# Patient Record
Sex: MALE | Race: WHITE | NOT HISPANIC OR LATINO | Employment: FULL TIME | URBAN - METROPOLITAN AREA
[De-identification: names, ages, dates, MRNs, and addresses within clinical notes are randomized per-mention and may not be internally consistent; named-entity substitution may affect disease eponyms.]

---

## 2022-12-08 ENCOUNTER — CLINICAL SUPPORT (OUTPATIENT)
Dept: INTERNAL MEDICINE | Facility: CLINIC | Age: 62
End: 2022-12-08

## 2022-12-08 ENCOUNTER — OFFICE VISIT (OUTPATIENT)
Dept: INTERNAL MEDICINE | Facility: CLINIC | Age: 62
End: 2022-12-08

## 2022-12-08 VITALS
DIASTOLIC BLOOD PRESSURE: 70 MMHG | WEIGHT: 205.38 LBS | HEART RATE: 62 BPM | BODY MASS INDEX: 30.42 KG/M2 | HEIGHT: 69 IN | SYSTOLIC BLOOD PRESSURE: 104 MMHG

## 2022-12-08 DIAGNOSIS — Z00.00 ENCOUNTER FOR ANNUAL HEALTH EXAMINATION: Primary | ICD-10-CM

## 2022-12-08 DIAGNOSIS — Z00.00 ROUTINE GENERAL MEDICAL EXAMINATION AT A HEALTH CARE FACILITY: Primary | ICD-10-CM

## 2022-12-08 DIAGNOSIS — Z23 NEED FOR SHINGLES VACCINE: ICD-10-CM

## 2022-12-08 LAB
ALBUMIN SERPL BCP-MCNC: 4 G/DL (ref 3.5–5.2)
ALP SERPL-CCNC: 88 U/L (ref 55–135)
ALT SERPL W/O P-5'-P-CCNC: 11 U/L (ref 10–44)
ANION GAP SERPL CALC-SCNC: 10 MMOL/L (ref 8–16)
AST SERPL-CCNC: 14 U/L (ref 10–40)
BILIRUB SERPL-MCNC: 1 MG/DL (ref 0.1–1)
BUN SERPL-MCNC: 14 MG/DL (ref 8–23)
CALCIUM SERPL-MCNC: 9.1 MG/DL (ref 8.7–10.5)
CHLORIDE SERPL-SCNC: 106 MMOL/L (ref 95–110)
CHOLEST SERPL-MCNC: 196 MG/DL (ref 120–199)
CHOLEST/HDLC SERPL: 4.2 {RATIO} (ref 2–5)
CO2 SERPL-SCNC: 24 MMOL/L (ref 23–29)
COMPLEXED PSA SERPL-MCNC: 0.65 NG/ML (ref 0–4)
CREAT SERPL-MCNC: 1.1 MG/DL (ref 0.5–1.4)
ERYTHROCYTE [DISTWIDTH] IN BLOOD BY AUTOMATED COUNT: 12.1 % (ref 11.5–14.5)
EST. GFR  (NO RACE VARIABLE): >60 ML/MIN/1.73 M^2
ESTIMATED AVG GLUCOSE: 103 MG/DL (ref 68–131)
GLUCOSE SERPL-MCNC: 96 MG/DL (ref 70–110)
HBA1C MFR BLD: 5.2 % (ref 4–5.6)
HCT VFR BLD AUTO: 48.9 % (ref 40–54)
HCV AB SERPL QL IA: NORMAL
HDLC SERPL-MCNC: 47 MG/DL (ref 40–75)
HDLC SERPL: 24 % (ref 20–50)
HGB BLD-MCNC: 16.5 G/DL (ref 14–18)
HIV 1+2 AB+HIV1 P24 AG SERPL QL IA: NORMAL
LDLC SERPL CALC-MCNC: 118.6 MG/DL (ref 63–159)
MCH RBC QN AUTO: 30.9 PG (ref 27–31)
MCHC RBC AUTO-ENTMCNC: 33.7 G/DL (ref 32–36)
MCV RBC AUTO: 92 FL (ref 82–98)
NONHDLC SERPL-MCNC: 149 MG/DL
PLATELET # BLD AUTO: 168 K/UL (ref 150–450)
PMV BLD AUTO: 10 FL (ref 9.2–12.9)
POTASSIUM SERPL-SCNC: 3.8 MMOL/L (ref 3.5–5.1)
PROT SERPL-MCNC: 6.7 G/DL (ref 6–8.4)
RBC # BLD AUTO: 5.34 M/UL (ref 4.6–6.2)
SODIUM SERPL-SCNC: 140 MMOL/L (ref 136–145)
TRIGL SERPL-MCNC: 152 MG/DL (ref 30–150)
TSH SERPL DL<=0.005 MIU/L-ACNC: 1.41 UIU/ML (ref 0.4–4)
WBC # BLD AUTO: 4.47 K/UL (ref 3.9–12.7)

## 2022-12-08 PROCEDURE — 90750 ZOSTER RECOMBINANT VACCINE: ICD-10-PCS | Mod: S$GLB,,, | Performed by: INTERNAL MEDICINE

## 2022-12-08 PROCEDURE — 99386 PREV VISIT NEW AGE 40-64: CPT | Mod: S$GLB,,, | Performed by: INTERNAL MEDICINE

## 2022-12-08 PROCEDURE — 99999 PR PBB SHADOW E&M-NEW PATIENT-LVL III: ICD-10-PCS | Mod: PBBFAC,,, | Performed by: INTERNAL MEDICINE

## 2022-12-08 PROCEDURE — 90471 IMMUNIZATION ADMIN: CPT | Mod: S$GLB,,, | Performed by: INTERNAL MEDICINE

## 2022-12-08 PROCEDURE — 84443 ASSAY THYROID STIM HORMONE: CPT | Performed by: INTERNAL MEDICINE

## 2022-12-08 PROCEDURE — 85027 COMPLETE CBC AUTOMATED: CPT | Performed by: INTERNAL MEDICINE

## 2022-12-08 PROCEDURE — 87389 HIV-1 AG W/HIV-1&-2 AB AG IA: CPT | Performed by: INTERNAL MEDICINE

## 2022-12-08 PROCEDURE — 90750 HZV VACC RECOMBINANT IM: CPT | Mod: S$GLB,,, | Performed by: INTERNAL MEDICINE

## 2022-12-08 PROCEDURE — 80053 COMPREHEN METABOLIC PANEL: CPT | Performed by: INTERNAL MEDICINE

## 2022-12-08 PROCEDURE — 99386 PR PREVENTIVE VISIT,NEW,40-64: ICD-10-PCS | Mod: S$GLB,,, | Performed by: INTERNAL MEDICINE

## 2022-12-08 PROCEDURE — 80061 LIPID PANEL: CPT | Performed by: INTERNAL MEDICINE

## 2022-12-08 PROCEDURE — 86803 HEPATITIS C AB TEST: CPT | Performed by: INTERNAL MEDICINE

## 2022-12-08 PROCEDURE — 90471 ZOSTER RECOMBINANT VACCINE: ICD-10-PCS | Mod: S$GLB,,, | Performed by: INTERNAL MEDICINE

## 2022-12-08 PROCEDURE — 84153 ASSAY OF PSA TOTAL: CPT | Performed by: INTERNAL MEDICINE

## 2022-12-08 PROCEDURE — 99999 PR PBB SHADOW E&M-NEW PATIENT-LVL III: CPT | Mod: PBBFAC,,, | Performed by: INTERNAL MEDICINE

## 2022-12-08 PROCEDURE — 83036 HEMOGLOBIN GLYCOSYLATED A1C: CPT | Performed by: INTERNAL MEDICINE

## 2022-12-08 RX ORDER — FLUOXETINE HYDROCHLORIDE 20 MG/1
1 CAPSULE ORAL DAILY
COMMUNITY
Start: 2022-08-02 | End: 2023-01-19

## 2022-12-08 RX ORDER — FLUTICASONE PROPIONATE 50 MCG
1 SPRAY, SUSPENSION (ML) NASAL
COMMUNITY

## 2022-12-08 RX ORDER — FAMOTIDINE 10 MG/1
10 TABLET ORAL 2 TIMES DAILY
COMMUNITY
End: 2023-09-25

## 2022-12-08 RX ORDER — CHOLECALCIFEROL (VITAMIN D3) 25 MCG
2000 TABLET ORAL DAILY
COMMUNITY
End: 2023-11-30

## 2022-12-08 RX ORDER — ALLOPURINOL 100 MG/1
100 TABLET ORAL
COMMUNITY
End: 2023-01-19

## 2022-12-08 RX ORDER — ALBUTEROL SULFATE 90 UG/1
1-2 AEROSOL, METERED RESPIRATORY (INHALATION) EVERY 6 HOURS PRN
COMMUNITY

## 2022-12-08 NOTE — PROGRESS NOTES
Subjective:       Patient ID: Oneal Reina is a 62 y.o. male.    Chief Complaint: Executive Health      HPI  Annual health exam. Reviewed medical, surgical, social and family history, medications, appropriate preventive health screenings, as well as vaccination history. Updates as noted below or in assessment and plan.    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 Originally from [place], [place]. Generally well. Exercising regularly and having some mild right shoulder pains since starting arm exercises. ROM and strength normal. Weight down recently. Watching diet but does eat fair amount of carbs. Mild HTG in past. Hx of 1 gout flair (confirmed) 15 yrs ago and no issues since with allopurinol 100 daily.    Review of Systems   All other systems reviewed and are negative.    Past Medical History:   Diagnosis Date    Allergic rhinitis     Anxiety     DDD (degenerative disc disease), cervical     GERD (gastroesophageal reflux disease)     Gout     History of shingles     Hypertriglyceridemia     Reactive airway disease     Renal cell carcinoma     Right, s/p partial nephrectomy         Current Outpatient Medications:     albuterol (PROVENTIL/VENTOLIN HFA) 90 mcg/actuation inhaler, Inhale 1-2 puffs into the lungs every 6 (six) hours as needed., Disp: , Rfl:     allopurinoL (ZYLOPRIM) 100 MG tablet, Take 100 mg by mouth., Disp: , Rfl:     CYANOCOBALAMIN, VITAMIN B-12, ORAL, Take 5,000 mcg by mouth., Disp: , Rfl:     famotidine (PEPCID) 10 MG tablet, Take 10 mg by mouth 2 (two) times daily., Disp: , Rfl:     FLUoxetine 20 MG capsule, Take 1 capsule by mouth once daily., Disp: , Rfl:     fluticasone propionate (FLONASE) 50 mcg/actuation nasal spray, 1 spray by Nasal route., Disp: , Rfl:     vitamin D (VITAMIN D3) 1000 units Tab, Take 2,000 Units by mouth once daily., Disp: , Rfl:     Past Surgical History:   Procedure Laterality Date    COLONOSCOPY  2021    Normal     ESOPHAGOGASTRODUODENOSCOPY      PARTIAL NEPHRECTOMY Right     RCC       Family History   Problem Relation Age of Onset    Breast cancer Mother     Testicular cancer Father     Pancreatic cancer Father     Colon polyps Brother     Brain cancer Paternal Grandmother     Colon cancer Neg Hx        Social History     Tobacco Use    Smoking status: Never    Smokeless tobacco: Never   Substance Use Topics    Alcohol use: Yes     Comment: Rarely       Immunization History   Administered Date(s) Administered    COVID-19, MRNA, LN-S, PF (Pfizer) (Purple Cap) 12/15/2020, 01/05/2021, 09/18/2021    Tdap 11/19/2018         Objective:      Vitals:    12/08/22 0838   BP: 104/70   Pulse: 62       Physical Exam  Constitutional:       General: He is not in acute distress.     Appearance: Normal appearance. He is well-developed. He is not ill-appearing.   HENT:      Head: Normocephalic and atraumatic.      Right Ear: Hearing and tympanic membrane normal. There is no impacted cerumen.      Left Ear: Hearing and tympanic membrane normal. There is no impacted cerumen.      Nose: Nose normal.      Mouth/Throat:      Mouth: Mucous membranes are moist.      Pharynx: Oropharynx is clear.   Eyes:      Extraocular Movements: Extraocular movements intact.      Conjunctiva/sclera: Conjunctivae normal.      Pupils: Pupils are equal, round, and reactive to light.   Neck:      Vascular: No carotid bruit.   Cardiovascular:      Rate and Rhythm: Normal rate and regular rhythm.      Heart sounds: Normal heart sounds. No murmur heard.  Pulmonary:      Effort: Pulmonary effort is normal. No respiratory distress.      Breath sounds: Normal breath sounds. No wheezing, rhonchi or rales.   Abdominal:      General: Abdomen is flat. There is no distension.      Palpations: Abdomen is soft. There is no mass.      Tenderness: There is no abdominal tenderness.      Hernia: No hernia is present.   Musculoskeletal:         General: Tenderness (Mild lateral right  shoulder joint.) present. No swelling, deformity or signs of injury. Normal range of motion.      Cervical back: Normal range of motion. No tenderness.      Right lower leg: No edema.      Left lower leg: No edema.   Lymphadenopathy:      Cervical: No cervical adenopathy.   Skin:     General: Skin is warm and dry.      Findings: No lesion or rash.   Neurological:      General: No focal deficit present.      Mental Status: He is alert and oriented to person, place, and time.      Cranial Nerves: No cranial nerve deficit.      Coordination: Coordination normal.      Gait: Gait normal.   Psychiatric:         Mood and Affect: Mood normal.         Behavior: Behavior normal.         Thought Content: Thought content normal.         Judgment: Judgment normal.       Recent Results (from the past 2016 hour(s))   Comprehensive metabolic panel    Collection Time: 12/08/22  7:28 AM   Result Value Ref Range    Sodium 140 136 - 145 mmol/L    Potassium 3.8 3.5 - 5.1 mmol/L    Chloride 106 95 - 110 mmol/L    CO2 24 23 - 29 mmol/L    Glucose 96 70 - 110 mg/dL    BUN 14 8 - 23 mg/dL    Creatinine 1.1 0.5 - 1.4 mg/dL    Calcium 9.1 8.7 - 10.5 mg/dL    Total Protein 6.7 6.0 - 8.4 g/dL    Albumin 4.0 3.5 - 5.2 g/dL    Total Bilirubin 1.0 0.1 - 1.0 mg/dL    Alkaline Phosphatase 88 55 - 135 U/L    AST 14 10 - 40 U/L    ALT 11 10 - 44 U/L    Anion Gap 10 8 - 16 mmol/L    eGFR >60.0 >60 mL/min/1.73 m^2   CBC Without Differential    Collection Time: 12/08/22  7:28 AM   Result Value Ref Range    WBC 4.47 3.90 - 12.70 K/uL    RBC 5.34 4.60 - 6.20 M/uL    Hemoglobin 16.5 14.0 - 18.0 g/dL    Hematocrit 48.9 40.0 - 54.0 %    MCV 92 82 - 98 fL    MCH 30.9 27.0 - 31.0 pg    MCHC 33.7 32.0 - 36.0 g/dL    RDW 12.1 11.5 - 14.5 %    Platelets 168 150 - 450 K/uL    MPV 10.0 9.2 - 12.9 fL   Lipid panel    Collection Time: 12/08/22  7:28 AM   Result Value Ref Range    Cholesterol 196 120 - 199 mg/dL    Triglycerides 152 (H) 30 - 150 mg/dL    HDL 47 40 - 75  mg/dL    LDL Cholesterol 118.6 63.0 - 159.0 mg/dL    HDL/Cholesterol Ratio 24.0 20.0 - 50.0 %    Total Cholesterol/HDL Ratio 4.2 2.0 - 5.0    Non-HDL Cholesterol 149 mg/dL   TSH    Collection Time: 12/08/22  7:28 AM   Result Value Ref Range    TSH 1.413 0.400 - 4.000 uIU/mL   PSA, Screening (every year)    Collection Time: 12/08/22  7:28 AM   Result Value Ref Range    PSA, Screen 0.65 0.00 - 4.00 ng/mL   Hepatitis C Antibody    Collection Time: 12/08/22  7:28 AM   Result Value Ref Range    Hepatitis C Ab Non-reactive Non-reactive   HIV 1/2 Ag/Ab (4th Gen)    Collection Time: 12/08/22  7:28 AM   Result Value Ref Range    HIV 1/2 Ag/Ab Non-reactive Non-reactive          Assessment/Plan:     1) Annual wellness exam  2) HTG - Very mild elevation with average cholesterol levels otherwise. Continue weight, exercise, diet focus with limitation of sugars/carbs. Repeat levels 1 yr.  3) Gout - Controlled for years on allopurinol 100 daily. He may trial off of this in future since borderline uric acid at diagnosis and only had the 1 episode. Agreed on continuing for now though since tolerating well.  4) Anxiety - Controlled on Fluoxetine 20 daily. May consider trial weaning in future but continuing for now.  5) GERD - Controlled on Pepcid nightly.  6) Allergic rhinitis, Reactive airway - Controlled with Flonase and occasional albuterol prn.  7) RCC hx - S/P right partial nephrectomy. Renal function healthy today. Surveillance as planned by previously following physicians.    - Annual flu vaccine received recently.  - 1st dose Shingrix today, 2nd dose 2 to 6 mths.  - Colonoscopy normal last yr with plan for 10 yr f/u.  - PSA normal. Consider repeat screening 1 yr.  - Glucose screen normal. Blood pressure normal.

## 2023-01-19 ENCOUNTER — PATIENT MESSAGE (OUTPATIENT)
Dept: INTERNAL MEDICINE | Facility: CLINIC | Age: 63
End: 2023-01-19
Payer: COMMERCIAL

## 2023-01-19 DIAGNOSIS — F41.9 ANXIETY: ICD-10-CM

## 2023-01-19 DIAGNOSIS — M10.9 GOUT, UNSPECIFIED CAUSE, UNSPECIFIED CHRONICITY, UNSPECIFIED SITE: Primary | ICD-10-CM

## 2023-01-19 RX ORDER — FLUOXETINE HYDROCHLORIDE 20 MG/1
20 CAPSULE ORAL DAILY
Qty: 90 CAPSULE | Refills: 3 | Status: SHIPPED | OUTPATIENT
Start: 2023-01-19 | End: 2024-02-14 | Stop reason: SDUPTHER

## 2023-01-19 RX ORDER — ALLOPURINOL 100 MG/1
100 TABLET ORAL DAILY
Qty: 90 TABLET | Refills: 3 | Status: SHIPPED | OUTPATIENT
Start: 2023-01-19 | End: 2023-02-17

## 2023-02-07 ENCOUNTER — PATIENT MESSAGE (OUTPATIENT)
Dept: INTERNAL MEDICINE | Facility: CLINIC | Age: 63
End: 2023-02-07
Payer: COMMERCIAL

## 2023-02-17 ENCOUNTER — OFFICE VISIT (OUTPATIENT)
Dept: UROLOGY | Facility: CLINIC | Age: 63
End: 2023-02-17
Payer: COMMERCIAL

## 2023-02-17 VITALS
HEIGHT: 69 IN | SYSTOLIC BLOOD PRESSURE: 125 MMHG | DIASTOLIC BLOOD PRESSURE: 80 MMHG | HEART RATE: 68 BPM | BODY MASS INDEX: 30.56 KG/M2 | WEIGHT: 206.31 LBS

## 2023-02-17 DIAGNOSIS — Z85.528 HISTORY OF RENAL CELL CARCINOMA: ICD-10-CM

## 2023-02-17 DIAGNOSIS — N48.89 PENIS PAIN: Primary | ICD-10-CM

## 2023-02-17 DIAGNOSIS — Z90.5 HISTORY OF PARTIAL NEPHRECTOMY: ICD-10-CM

## 2023-02-17 DIAGNOSIS — N48.6 PEYRONIE'S SYNDROME: ICD-10-CM

## 2023-02-17 LAB
BILIRUB SERPL-MCNC: NORMAL MG/DL
BLOOD URINE, POC: NORMAL
CLARITY, POC UA: CLEAR
COLOR, POC UA: YELLOW
GLUCOSE UR QL STRIP: NORMAL
KETONES UR QL STRIP: NORMAL
LEUKOCYTE ESTERASE URINE, POC: NORMAL
NITRITE, POC UA: NORMAL
PH, POC UA: 6
PROTEIN, POC: NORMAL
SPECIFIC GRAVITY, POC UA: 1.01
UROBILINOGEN, POC UA: NORMAL

## 2023-02-17 PROCEDURE — 81002 URINALYSIS NONAUTO W/O SCOPE: CPT | Mod: S$GLB,,, | Performed by: NURSE PRACTITIONER

## 2023-02-17 PROCEDURE — 99999 PR PBB SHADOW E&M-EST. PATIENT-LVL III: CPT | Mod: PBBFAC,,, | Performed by: NURSE PRACTITIONER

## 2023-02-17 PROCEDURE — 3008F BODY MASS INDEX DOCD: CPT | Mod: CPTII,S$GLB,, | Performed by: NURSE PRACTITIONER

## 2023-02-17 PROCEDURE — 99999 PR PBB SHADOW E&M-EST. PATIENT-LVL III: ICD-10-PCS | Mod: PBBFAC,,, | Performed by: NURSE PRACTITIONER

## 2023-02-17 PROCEDURE — 1159F MED LIST DOCD IN RCRD: CPT | Mod: CPTII,S$GLB,, | Performed by: NURSE PRACTITIONER

## 2023-02-17 PROCEDURE — 1160F PR REVIEW ALL MEDS BY PRESCRIBER/CLIN PHARMACIST DOCUMENTED: ICD-10-PCS | Mod: CPTII,S$GLB,, | Performed by: NURSE PRACTITIONER

## 2023-02-17 PROCEDURE — 99204 OFFICE O/P NEW MOD 45 MIN: CPT | Mod: S$GLB,,, | Performed by: NURSE PRACTITIONER

## 2023-02-17 PROCEDURE — 81002 POCT URINE DIPSTICK WITHOUT MICROSCOPE: ICD-10-PCS | Mod: S$GLB,,, | Performed by: NURSE PRACTITIONER

## 2023-02-17 PROCEDURE — 1159F PR MEDICATION LIST DOCUMENTED IN MEDICAL RECORD: ICD-10-PCS | Mod: CPTII,S$GLB,, | Performed by: NURSE PRACTITIONER

## 2023-02-17 PROCEDURE — 3074F SYST BP LT 130 MM HG: CPT | Mod: CPTII,S$GLB,, | Performed by: NURSE PRACTITIONER

## 2023-02-17 PROCEDURE — 3079F PR MOST RECENT DIASTOLIC BLOOD PRESSURE 80-89 MM HG: ICD-10-PCS | Mod: CPTII,S$GLB,, | Performed by: NURSE PRACTITIONER

## 2023-02-17 PROCEDURE — 99204 PR OFFICE/OUTPT VISIT, NEW, LEVL IV, 45-59 MIN: ICD-10-PCS | Mod: S$GLB,,, | Performed by: NURSE PRACTITIONER

## 2023-02-17 PROCEDURE — 3079F DIAST BP 80-89 MM HG: CPT | Mod: CPTII,S$GLB,, | Performed by: NURSE PRACTITIONER

## 2023-02-17 PROCEDURE — 3008F PR BODY MASS INDEX (BMI) DOCUMENTED: ICD-10-PCS | Mod: CPTII,S$GLB,, | Performed by: NURSE PRACTITIONER

## 2023-02-17 PROCEDURE — 1160F RVW MEDS BY RX/DR IN RCRD: CPT | Mod: CPTII,S$GLB,, | Performed by: NURSE PRACTITIONER

## 2023-02-17 PROCEDURE — 3074F PR MOST RECENT SYSTOLIC BLOOD PRESSURE < 130 MM HG: ICD-10-PCS | Mod: CPTII,S$GLB,, | Performed by: NURSE PRACTITIONER

## 2023-02-17 RX ORDER — NAPROXEN 500 MG/1
500 TABLET ORAL 2 TIMES DAILY WITH MEALS
Qty: 30 TABLET | Refills: 1 | Status: SHIPPED | OUTPATIENT
Start: 2023-02-17 | End: 2023-03-04

## 2023-02-17 RX ORDER — ALLOPURINOL 300 MG/1
300 TABLET ORAL
COMMUNITY
Start: 2023-01-23 | End: 2023-08-14

## 2023-02-17 NOTE — PROGRESS NOTES
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 This is a new patient to for me. I personally reviewed their recent medical records as well as their outside medical, surgical, family, & social history.     2008 RCC; s/p right partial nephrectomy    He is here today due to distal penis pain with an erection. The pain is around the entire penis/just behind the glans. This will wake him up at night. This has been going on for about 3 months. No swelling/redness; a bluish color to corona.     He report that 2021 he was seen by a urologist at [facility] due to a painless upward curvature of the head of his penis.   No pain. No issues with penetration.  He was diagnosed with Peyronie's with a palpable plaque and base of the penis.   He underwent 4 Xiaflex injections. After the 4th injection he developed a indentation to right base of the penis.   Now seems to have a painless minimal curvature to the left   Minimal improvement with upward curvature of head of penis.     He voices no urinary complaints.   No abdominal or bone pain.  No real issues with ED.             REVIEW OF SYSTEMS:  Review of Systems   Constitutional: Negative.  Negative for chills and fever.   Eyes:  Negative for double vision.   Respiratory:  Negative for cough and shortness of breath.    Cardiovascular:  Negative for chest pain.   Gastrointestinal:  Negative for abdominal pain, constipation, diarrhea, nausea and vomiting.   Genitourinary: Negative.  Negative for dysuria, flank pain and hematuria.        Ok with urination;      Neurological:  Negative for dizziness and seizures.   Endo/Heme/Allergies:  Negative for polydipsia.       PATIENT HISTORY:    Past Medical History:   Diagnosis Date    Allergic rhinitis     Anxiety     DDD (degenerative disc disease), cervical     GERD (gastroesophageal reflux  disease)     Gout     History of shingles     Hypertriglyceridemia     Reactive airway disease     Renal cell carcinoma     Right, s/p partial nephrectomy       Past Surgical History:   Procedure Laterality Date    COLONOSCOPY  2021    Normal    ESOPHAGOGASTRODUODENOSCOPY      PARTIAL NEPHRECTOMY Right     RCC    SMALL INTESTINE SURGERY      Volvulus, SBO       Family History   Problem Relation Age of Onset    Breast cancer Mother     Testicular cancer Father     Pancreatic cancer Father     Colon polyps Brother     Brain cancer Paternal Grandmother     Colon cancer Neg Hx        Social History     Socioeconomic History    Marital status:    Tobacco Use    Smoking status: Never    Smokeless tobacco: Never   Substance and Sexual Activity    Alcohol use: Yes     Comment: Rarely       Allergies:  Patient has no known allergies.    Medications:    Current Outpatient Medications:     albuterol (PROVENTIL/VENTOLIN HFA) 90 mcg/actuation inhaler, Inhale 1-2 puffs into the lungs every 6 (six) hours as needed., Disp: , Rfl:     allopurinoL (ZYLOPRIM) 300 MG tablet, Take 300 mg by mouth., Disp: , Rfl:     CYANOCOBALAMIN, VITAMIN B-12, ORAL, Take 5,000 mcg by mouth., Disp: , Rfl:     famotidine (PEPCID) 10 MG tablet, Take 10 mg by mouth 2 (two) times daily., Disp: , Rfl:     FLUoxetine 20 MG capsule, Take 1 capsule (20 mg total) by mouth once daily., Disp: 90 capsule, Rfl: 3    fluticasone propionate (FLONASE) 50 mcg/actuation nasal spray, 1 spray by Nasal route., Disp: , Rfl:     vitamin D (VITAMIN D3) 1000 units Tab, Take 2,000 Units by mouth once daily., Disp: , Rfl:     naproxen (NAPROSYN) 500 MG tablet, Take 1 tablet (500 mg total) by mouth 2 (two) times daily with meals. for 15 days, Disp: 30 tablet, Rfl: 1    PHYSICAL EXAMINATION:  Physical Exam  Vitals and nursing note reviewed.   Constitutional:       General: He is awake.      Appearance: Normal appearance.   HENT:      Head: Normocephalic.      Right Ear:  External ear normal.      Left Ear: External ear normal.      Nose: Nose normal.   Cardiovascular:      Rate and Rhythm: Normal rate.   Pulmonary:      Effort: Pulmonary effort is normal. No respiratory distress.   Abdominal:      Tenderness: There is no abdominal tenderness. There is no right CVA tenderness or left CVA tenderness.   Genitourinary:     Penis: Normal and circumcised. No hypospadias, erythema, tenderness, discharge, swelling or lesions.       Testes: Normal.         Right: Mass, tenderness or swelling not present.         Left: Mass, tenderness or swelling not present.      Comments: Normal ; no palpable plaques noted.    Musculoskeletal:         General: Normal range of motion.      Cervical back: Normal range of motion.   Skin:     General: Skin is warm and dry.   Neurological:      General: No focal deficit present.      Mental Status: He is alert and oriented to person, place, and time.   Psychiatric:         Mood and Affect: Mood normal.         Behavior: Behavior is cooperative.         LABS:      In office UA today was clear of infection and blood.       Lab Results   Component Value Date    PSA 0.65 12/08/2022       Lab Results   Component Value Date    CREATININE 1.1 12/08/2022    EGFRNORACEVR >60.0 12/08/2022             IMPRESSION:    Encounter Diagnoses   Name Primary?    Penis pain Yes    Peyronie's syndrome     History of renal cell carcinoma     History of partial nephrectomy          Assessment:       1. Penis pain    2. Peyronie's syndrome    3. History of renal cell carcinoma    4. History of partial nephrectomy          Plan:         I spent 45 minutes with the patient of which more than half was spent in direct consultation with the patient in regards to our treatment and plan.  We addressed the office findings and recent labs.   Education and recommendations of today's plan of care including home remedies and needed follow up with PCP.   We discussed the chief complaint;  reviewed the LUTS and the possible contributory factors.   At this point recommend Daypro 600mg BID for 2-4 weeks  The antiinflammatories to reduce inflammation & other factors that could be causing the discomfort  Will consult Dr. Estrada

## 2023-07-28 ENCOUNTER — OFFICE VISIT (OUTPATIENT)
Dept: OPTOMETRY | Facility: CLINIC | Age: 63
End: 2023-07-28
Payer: COMMERCIAL

## 2023-07-28 DIAGNOSIS — H50.811 DUANE'S SYNDROME OF BOTH EYES: ICD-10-CM

## 2023-07-28 DIAGNOSIS — H52.203 MYOPIA WITH ASTIGMATISM AND PRESBYOPIA, BILATERAL: Primary | ICD-10-CM

## 2023-07-28 DIAGNOSIS — H25.13 SENILE NUCLEAR SCLEROSIS, BILATERAL: ICD-10-CM

## 2023-07-28 DIAGNOSIS — H52.4 MYOPIA WITH ASTIGMATISM AND PRESBYOPIA, BILATERAL: Primary | ICD-10-CM

## 2023-07-28 DIAGNOSIS — H52.13 MYOPIA WITH ASTIGMATISM AND PRESBYOPIA, BILATERAL: Primary | ICD-10-CM

## 2023-07-28 DIAGNOSIS — H50.812 DUANE'S SYNDROME OF BOTH EYES: ICD-10-CM

## 2023-07-28 PROCEDURE — 92015 PR REFRACTION: ICD-10-PCS | Mod: S$GLB,,, | Performed by: OPTOMETRIST

## 2023-07-28 PROCEDURE — 99999 PR PBB SHADOW E&M-EST. PATIENT-LVL III: ICD-10-PCS | Mod: PBBFAC,,, | Performed by: OPTOMETRIST

## 2023-07-28 PROCEDURE — 99999 PR PBB SHADOW E&M-EST. PATIENT-LVL III: CPT | Mod: PBBFAC,,, | Performed by: OPTOMETRIST

## 2023-07-28 PROCEDURE — 92004 PR EYE EXAM, NEW PATIENT,COMPREHESV: ICD-10-PCS | Mod: S$GLB,,, | Performed by: OPTOMETRIST

## 2023-07-28 PROCEDURE — 92015 DETERMINE REFRACTIVE STATE: CPT | Mod: S$GLB,,, | Performed by: OPTOMETRIST

## 2023-07-28 PROCEDURE — 92004 COMPRE OPH EXAM NEW PT 1/>: CPT | Mod: S$GLB,,, | Performed by: OPTOMETRIST

## 2023-07-28 RX ORDER — NAPROXEN 500 MG/1
500 TABLET ORAL 2 TIMES DAILY
COMMUNITY
Start: 2023-04-03 | End: 2023-09-25 | Stop reason: SDUPTHER

## 2023-07-28 NOTE — PROGRESS NOTES
GIOVANNY PURI: about 3 yrs. Ago elsewhere  Chief complaint (CC): Patient is here for annual eye exam today. Patient   is a developmental pediatrician.  Patient hasn't got new glasses in 13   yrs. And sees near better without glasses.  Seeing captions on TV are not   as clear.  Patient has noticed more trouble with night driving.  Glasses? +13 yrs. old  Contacts? -  H/o eye surgery, injections or laser: -  H/o eye injury: -  Known eye conditions? Duane's syndrome  Family h/o eye conditions? -  Eye gtts? -      (-) Flashes (-)  Floaters (-) Mucous   (-)  Tearing (-) Itching (-) Burning   (-) Headaches (-) Eye Pain/discomfort (-) Irritation   (-)  Redness (-) Double vision (-) Blurry vision    Diabetic? -  A1c? -      Last edited by Ramila Sewell on 7/28/2023  8:43 AM.            Assessment /Plan     For exam results, see Encounter Report.      Myopia with astigmatism and presbyopia, bilateral  SRx released to patient. Patient educated on lens options. Normal ocular health. RTC 1 year for routine exam.     Duane's syndrome of both eyes  Stable. Limited Abduction OS and small AET. No reports of diplopia and no reports of depth perception issues.     Senile nuclear sclerosis, bilateral  Nuclear sclerotic cataract - not visually significant. Observe.        [place] development pediatrician [place] [facility] [place]

## 2023-07-31 ENCOUNTER — TELEPHONE (OUTPATIENT)
Dept: OPTOMETRY | Facility: CLINIC | Age: 63
End: 2023-07-31
Payer: COMMERCIAL

## 2023-07-31 NOTE — TELEPHONE ENCOUNTER
----- Message from Angie Lancaster sent at 7/31/2023  8:37 AM CDT -----  Regarding: Pt advice  Contact: Pt wife  Pt wife is requesting a callback regarding pt recent visit on last Fri 7/28. She stated the pt may have dropped a passport card at the  while checking in/out. She is requesting a callback. Please adv pt      Confirmed contact below:   Contact Name:Pt wife   Phone Number: [phone]

## 2023-08-12 ENCOUNTER — PATIENT MESSAGE (OUTPATIENT)
Dept: INTERNAL MEDICINE | Facility: CLINIC | Age: 63
End: 2023-08-12
Payer: COMMERCIAL

## 2023-08-14 DIAGNOSIS — M10.9 GOUT, UNSPECIFIED CAUSE, UNSPECIFIED CHRONICITY, UNSPECIFIED SITE: Primary | ICD-10-CM

## 2023-08-14 RX ORDER — ALLOPURINOL 300 MG/1
300 TABLET ORAL DAILY
Qty: 90 TABLET | Refills: 3 | Status: SHIPPED | OUTPATIENT
Start: 2023-08-14

## 2023-08-23 ENCOUNTER — LAB VISIT (OUTPATIENT)
Dept: LAB | Facility: HOSPITAL | Age: 63
End: 2023-08-23
Payer: COMMERCIAL

## 2023-08-23 ENCOUNTER — OFFICE VISIT (OUTPATIENT)
Dept: ALLERGY | Facility: CLINIC | Age: 63
End: 2023-08-23
Payer: COMMERCIAL

## 2023-08-23 VITALS — BODY MASS INDEX: 32.92 KG/M2 | WEIGHT: 222.25 LBS | HEIGHT: 69 IN

## 2023-08-23 DIAGNOSIS — D72.89: ICD-10-CM

## 2023-08-23 DIAGNOSIS — D72.89: Primary | ICD-10-CM

## 2023-08-23 DIAGNOSIS — J30.2 SEASONAL ALLERGIC RHINITIS, UNSPECIFIED TRIGGER: ICD-10-CM

## 2023-08-23 LAB
BASOPHILS # BLD AUTO: 0.02 K/UL (ref 0–0.2)
BASOPHILS NFR BLD: 0.3 % (ref 0–1.9)
DIFFERENTIAL METHOD: ABNORMAL
EOSINOPHIL # BLD AUTO: 0.1 K/UL (ref 0–0.5)
EOSINOPHIL NFR BLD: 1.8 % (ref 0–8)
ERYTHROCYTE [DISTWIDTH] IN BLOOD BY AUTOMATED COUNT: 11.9 % (ref 11.5–14.5)
HCT VFR BLD AUTO: 48.8 % (ref 40–54)
HGB BLD-MCNC: 16.7 G/DL (ref 14–18)
IGA SERPL-MCNC: 107 MG/DL (ref 40–350)
IGG SERPL-MCNC: 957 MG/DL (ref 650–1600)
IGM SERPL-MCNC: 29 MG/DL (ref 50–300)
IMM GRANULOCYTES # BLD AUTO: 0.02 K/UL (ref 0–0.04)
IMM GRANULOCYTES NFR BLD AUTO: 0.3 % (ref 0–0.5)
LYMPHOCYTES # BLD AUTO: 1.9 K/UL (ref 1–4.8)
LYMPHOCYTES NFR BLD: 31.5 % (ref 18–48)
MCH RBC QN AUTO: 31.2 PG (ref 27–31)
MCHC RBC AUTO-ENTMCNC: 34.2 G/DL (ref 32–36)
MCV RBC AUTO: 91 FL (ref 82–98)
MONOCYTES # BLD AUTO: 0.4 K/UL (ref 0.3–1)
MONOCYTES NFR BLD: 6.5 % (ref 4–15)
NEUTROPHILS # BLD AUTO: 3.6 K/UL (ref 1.8–7.7)
NEUTROPHILS NFR BLD: 59.6 % (ref 38–73)
NRBC BLD-RTO: 0 /100 WBC
PLATELET # BLD AUTO: 164 K/UL (ref 150–450)
PMV BLD AUTO: 10 FL (ref 9.2–12.9)
RBC # BLD AUTO: 5.36 M/UL (ref 4.6–6.2)
WBC # BLD AUTO: 5.97 K/UL (ref 3.9–12.7)

## 2023-08-23 PROCEDURE — 3008F BODY MASS INDEX DOCD: CPT | Mod: CPTII,S$GLB,, | Performed by: ALLERGY & IMMUNOLOGY

## 2023-08-23 PROCEDURE — 1159F MED LIST DOCD IN RCRD: CPT | Mod: CPTII,S$GLB,, | Performed by: ALLERGY & IMMUNOLOGY

## 2023-08-23 PROCEDURE — 99999 PR PBB SHADOW E&M-EST. PATIENT-LVL III: CPT | Mod: PBBFAC,,, | Performed by: ALLERGY & IMMUNOLOGY

## 2023-08-23 PROCEDURE — 86360 T CELL ABSOLUTE COUNT/RATIO: CPT | Performed by: ALLERGY & IMMUNOLOGY

## 2023-08-23 PROCEDURE — 99204 PR OFFICE/OUTPT VISIT, NEW, LEVL IV, 45-59 MIN: ICD-10-PCS | Mod: S$GLB,,, | Performed by: ALLERGY & IMMUNOLOGY

## 2023-08-23 PROCEDURE — 1159F PR MEDICATION LIST DOCUMENTED IN MEDICAL RECORD: ICD-10-PCS | Mod: CPTII,S$GLB,, | Performed by: ALLERGY & IMMUNOLOGY

## 2023-08-23 PROCEDURE — 99204 OFFICE O/P NEW MOD 45 MIN: CPT | Mod: S$GLB,,, | Performed by: ALLERGY & IMMUNOLOGY

## 2023-08-23 PROCEDURE — 86359 T CELLS TOTAL COUNT: CPT | Performed by: ALLERGY & IMMUNOLOGY

## 2023-08-23 PROCEDURE — 86357 NK CELLS TOTAL COUNT: CPT | Performed by: ALLERGY & IMMUNOLOGY

## 2023-08-23 PROCEDURE — 85025 COMPLETE CBC W/AUTO DIFF WBC: CPT | Performed by: ALLERGY & IMMUNOLOGY

## 2023-08-23 PROCEDURE — 82784 ASSAY IGA/IGD/IGG/IGM EACH: CPT | Performed by: ALLERGY & IMMUNOLOGY

## 2023-08-23 PROCEDURE — 86355 B CELLS TOTAL COUNT: CPT | Performed by: ALLERGY & IMMUNOLOGY

## 2023-08-23 PROCEDURE — 3008F PR BODY MASS INDEX (BMI) DOCUMENTED: ICD-10-PCS | Mod: CPTII,S$GLB,, | Performed by: ALLERGY & IMMUNOLOGY

## 2023-08-23 PROCEDURE — 86317 IMMUNOASSAY INFECTIOUS AGENT: CPT | Performed by: ALLERGY & IMMUNOLOGY

## 2023-08-23 PROCEDURE — 99999 PR PBB SHADOW E&M-EST. PATIENT-LVL III: ICD-10-PCS | Mod: PBBFAC,,, | Performed by: ALLERGY & IMMUNOLOGY

## 2023-08-23 NOTE — PROGRESS NOTES
Subjective:       Patient ID: Oneal Reina is a 63 y.o. male.    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 Recalls that the low NK cell count was an incidental finding several years ago when he volunteered blood as part of a study. Was later told by immunologist in [place] that the low value was just low end of the bell curve but lymphocyte subpopulations were never repeated.  Infection hx noteable for viral meningitis in 1992 (enterovirus B). Hx shingles prior to 51 yrs of age. Denies hx of frequent ear, sinus, or respiratory infections. Denies recurrent skin abscesses. Denies chronic rashes. Does have hx renal cell CA noted in 2006 as incidental finding.    Does have hx spring time rhinitis, w sx's of late seeming more perennial. Cetirizine more effective for him than other antihistamines.  Has been in [place] since Dec  [place] 13 yrs prior    Rare need for albuterol w flares of rhinitis sx's  Once was on flovent >5 yrs ago  No systemic steroids for wheeze    No eczema no food allergy      Environmental History: Pets in the home: dogs (1).  [place]: tile or linoleum floors  Tobacco Smoke in Home: no    Past Medical History:   Diagnosis Date    Allergic rhinitis     Anxiety     DDD (degenerative disc disease), cervical     GERD (gastroesophageal reflux disease)     Gout     History of shingles     Hypertriglyceridemia     Reactive airway disease     Renal cell carcinoma     Right, s/p partial nephrectomy       Family History   Problem Relation Age of Onset    Breast cancer Mother     Asthma Father     Allergies Father     Testicular cancer Father     Pancreatic cancer Father     Allergies Brother     Asthma Brother     Colon polyps Brother     Brain cancer Paternal Grandmother     Asthma Son     Allergies Son     Asthma Daughter     Allergies Daughter     Colon cancer Neg Hx          Review of Systems   Constitutional:  Negative for activity change,  fatigue and fever.   HENT:  Negative for congestion, postnasal drip, rhinorrhea, sinus pressure and sneezing.    Eyes:  Negative for discharge, redness and itching.   Respiratory:  Negative for cough, shortness of breath and wheezing.    Cardiovascular:  Negative for chest pain.   Gastrointestinal:  Negative for constipation, diarrhea, nausea and vomiting.   Genitourinary:  Negative for difficulty urinating.   Musculoskeletal:  Negative for joint swelling and myalgias.   Skin:  Negative for rash.   Neurological:  Negative for headaches.   Hematological:  Does not bruise/bleed easily.   Psychiatric/Behavioral:  Negative for behavioral problems and sleep disturbance.         Objective:   Physical Exam  Constitutional:       General: He is not in acute distress.     Appearance: He is well-developed. He is not diaphoretic.   HENT:      Head: Normocephalic and atraumatic.      Right Ear: Tympanic membrane and external ear normal.      Left Ear: Tympanic membrane and external ear normal.      Nose: Nose normal.      Mouth/Throat:      Pharynx: No oropharyngeal exudate.   Eyes:      General:         Right eye: No discharge.         Left eye: No discharge.      Conjunctiva/sclera: Conjunctivae normal.   Neck:      Thyroid: No thyromegaly.   Cardiovascular:      Rate and Rhythm: Normal rate and regular rhythm.   Pulmonary:      Effort: Pulmonary effort is normal. No respiratory distress.      Breath sounds: Normal breath sounds. No wheezing.   Abdominal:      General: Bowel sounds are normal. There is no distension.      Palpations: Abdomen is soft.      Tenderness: There is no abdominal tenderness.   Musculoskeletal:         General: Normal range of motion.      Cervical back: Normal range of motion and neck supple.   Lymphadenopathy:      Cervical: No cervical adenopathy.   Skin:     General: Skin is warm.      Findings: No erythema or rash.   Neurological:      Mental Status: He is alert and oriented to person, place, and  time.      Motor: No abnormal muscle tone.   Psychiatric:         Behavior: Behavior normal.         Thought Content: Thought content normal.         Judgment: Judgment normal.           IgG   Date Value Ref Range Status   08/23/2023 957 650 - 1600 mg/dL Final     Comment:     IgG Cord Blood Reference Range: 650-1600 mg/dL.     IgM   Date Value Ref Range Status   08/23/2023 29 (L) 50 - 300 mg/dL Final     Comment:     IgM Cord Blood Reference Range: <25 mg/dL.     IgA   Date Value Ref Range Status   08/23/2023 107 40 - 350 mg/dL Final     Comment:     IgA Cord Blood Reference Range: <5 mg/dL.     Eos #   Date Value Ref Range Status   08/23/2023 0.1 0.0 - 0.5 K/uL Final     Eosinophil %   Date Value Ref Range Status   08/23/2023 1.8 0.0 - 8.0 % Final     Pneumococcal titers:  No results found for this or any previous visit.       Assessment:       1. 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 Seasonal allergic rhinitis, unspecified trigger         Plan:       Oneal was seen today for allergies.    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 This includes face to face time and non-face to face time preparing to see the patient (eg, review of tests), obtaining and/or reviewing separately obtained history, documenting clinical information in the electronic or other health record, independently interpreting results and communicating results to the patient/family/caregiver, or care coordinator.

## 2023-08-24 LAB
CD3+CD4+ CELLS # BLD: 1233 CELLS/UL (ref 300–1400)
CD3+CD4+ CELLS NFR BLD: 61.5 % (ref 28–57)
LYMPHOCYTES NFR CSF MANUAL: 11.4 % (ref 6–19)
LYMPHOCYTES NFR CSF MANUAL: 15.6 % (ref 10–39)
LYMPHOCYTES NFR CSF MANUAL: 154 CELLS/UL (ref 90–600)
LYMPHOCYTES NFR CSF MANUAL: 1612 CELLS/UL (ref 700–2100)
LYMPHOCYTES NFR CSF MANUAL: 237 CELLS/UL (ref 100–500)
LYMPHOCYTES NFR CSF MANUAL: 3.93 % (ref 0.9–3.6)
LYMPHOCYTES NFR CSF MANUAL: 314 CELLS/UL (ref 200–900)
LYMPHOCYTES NFR CSF MANUAL: 7.4 % (ref 7–31)
LYMPHOCYTES NFR CSF MANUAL: 80.4 % (ref 55–83)

## 2023-08-29 ENCOUNTER — PATIENT MESSAGE (OUTPATIENT)
Dept: ALLERGY | Facility: CLINIC | Age: 63
End: 2023-08-29
Payer: COMMERCIAL

## 2023-09-01 LAB
DEPRECATED S PNEUM12 IGG SER-MCNC: 0.5 UG/ML
DEPRECATED S PNEUM23 IGG SER-MCNC: <0.3 UG/ML
DEPRECATED S PNEUM4 IGG SER-MCNC: <0.3 UG/ML
DEPRECATED S PNEUM8 IGG SER-MCNC: 0.4 UG/ML
DEPRECATED S PNEUM9 IGG SER-MCNC: <0.3 UG/ML
S PN DA SERO 19F IGG SER-MCNC: 1.1 UG/ML
S PNEUM DA 1 IGG SER-MCNC: <0.3 UG/ML
S PNEUM DA 14 IGG SER-MCNC: <0.3
S PNEUM DA 18C IGG SER-MCNC: <0.3
S PNEUM DA 3 IGG SER-MCNC: <0.3 UG/ML
S PNEUM DA 5 IGG SER-MCNC: <0.3 UG/ML
S PNEUM DA 6B IGG SER-MCNC: <0.3 UG/ML
S PNEUM DA 7F IGG SER-MCNC: <0.3 UG/ML
S PNEUM DA 9V IGG SER-MCNC: <0.3 UG/ML

## 2023-09-05 ENCOUNTER — PATIENT MESSAGE (OUTPATIENT)
Dept: ALLERGY | Facility: CLINIC | Age: 63
End: 2023-09-05
Payer: COMMERCIAL

## 2023-09-05 DIAGNOSIS — R76.0 ABNORMAL ANTIBODY TITER: Primary | ICD-10-CM

## 2023-09-25 ENCOUNTER — OFFICE VISIT (OUTPATIENT)
Dept: DERMATOLOGY | Facility: CLINIC | Age: 63
End: 2023-09-25
Payer: COMMERCIAL

## 2023-09-25 DIAGNOSIS — D18.01 CHERRY ANGIOMA: ICD-10-CM

## 2023-09-25 DIAGNOSIS — L82.1 STUCCO KERATOSIS: ICD-10-CM

## 2023-09-25 DIAGNOSIS — Z12.83 SCREENING EXAM FOR SKIN CANCER: ICD-10-CM

## 2023-09-25 DIAGNOSIS — L73.9 FOLLICULITIS: ICD-10-CM

## 2023-09-25 DIAGNOSIS — L57.0 AK (ACTINIC KERATOSIS): Primary | ICD-10-CM

## 2023-09-25 DIAGNOSIS — D22.9 MULTIPLE BENIGN NEVI: ICD-10-CM

## 2023-09-25 PROCEDURE — 99999 PR PBB SHADOW E&M-EST. PATIENT-LVL III: CPT | Mod: PBBFAC,,, | Performed by: DERMATOLOGY

## 2023-09-25 PROCEDURE — 99999 PR PBB SHADOW E&M-EST. PATIENT-LVL III: ICD-10-PCS | Mod: PBBFAC,,, | Performed by: DERMATOLOGY

## 2023-09-25 PROCEDURE — 17003 DESTRUCT PREMALG LES 2-14: CPT | Mod: S$GLB,,, | Performed by: DERMATOLOGY

## 2023-09-25 PROCEDURE — 1160F PR REVIEW ALL MEDS BY PRESCRIBER/CLIN PHARMACIST DOCUMENTED: ICD-10-PCS | Mod: CPTII,S$GLB,, | Performed by: DERMATOLOGY

## 2023-09-25 PROCEDURE — 17000 DESTRUCT PREMALG LESION: CPT | Mod: S$GLB,,, | Performed by: DERMATOLOGY

## 2023-09-25 PROCEDURE — 17003 DESTRUCTION, PREMALIGNANT LESIONS; SECOND THROUGH 14 LESIONS: ICD-10-PCS | Mod: S$GLB,,, | Performed by: DERMATOLOGY

## 2023-09-25 PROCEDURE — 1159F MED LIST DOCD IN RCRD: CPT | Mod: CPTII,S$GLB,, | Performed by: DERMATOLOGY

## 2023-09-25 PROCEDURE — 99204 OFFICE O/P NEW MOD 45 MIN: CPT | Mod: 25,S$GLB,, | Performed by: DERMATOLOGY

## 2023-09-25 PROCEDURE — 1160F RVW MEDS BY RX/DR IN RCRD: CPT | Mod: CPTII,S$GLB,, | Performed by: DERMATOLOGY

## 2023-09-25 PROCEDURE — 1159F PR MEDICATION LIST DOCUMENTED IN MEDICAL RECORD: ICD-10-PCS | Mod: CPTII,S$GLB,, | Performed by: DERMATOLOGY

## 2023-09-25 PROCEDURE — 99204 PR OFFICE/OUTPT VISIT, NEW, LEVL IV, 45-59 MIN: ICD-10-PCS | Mod: 25,S$GLB,, | Performed by: DERMATOLOGY

## 2023-09-25 PROCEDURE — 17000 PR DESTRUCTION(LASER SURGERY,CRYOSURGERY,CHEMOSURGERY),PREMALIGNANT LESIONS,FIRST LESION: ICD-10-PCS | Mod: S$GLB,,, | Performed by: DERMATOLOGY

## 2023-09-25 RX ORDER — CLINDAMYCIN PHOSPHATE 11.9 MG/ML
SOLUTION TOPICAL
Qty: 60 ML | Refills: 3 | Status: SHIPPED | OUTPATIENT
Start: 2023-09-25 | End: 2023-11-30

## 2023-09-25 NOTE — PROGRESS NOTES
Subjective:      Patient ID:  Oneal Reina is a 63 y.o. male who presents for   Chief Complaint   Patient presents with    Skin Check     TBSE      Patient here for Total Body Skin Exam    Last seen by dermatologist: 2018    none - personal history of atypical moles removed  none - personal history of MM   none - family history of MM  no - childhood blistering sunburns  none - tanning bed use  none - personal history of NMSC    Patient with specific complaint of lesion(s)  Location: scalp  Duration: 1 yr   Symptoms: tender to touch and eventually scabs   Relieving factors/Previous treatments: none            Review of Systems   Skin:  Positive for activity-related sunscreen use and wears hat. Negative for daily sunscreen use and recent sunburn.   Hematologic/Lymphatic: Does not bruise/bleed easily.       Objective:   Physical Exam   Constitutional: He appears well-developed and well-nourished. No distress.   Neurological: He is alert and oriented to person, place, and time. He is not disoriented.   Psychiatric: He has a normal mood and affect.   Skin:   Areas Examined (abnormalities noted in diagram):   Scalp / Hair Palpated and Inspected  Head / Face Inspection Performed  Neck Inspection Performed  Chest / Axilla Inspection Performed  Abdomen Inspection Performed  Genitals / Buttocks / Groin Inspection Performed  Back Inspection Performed  RUE Inspected  LUE Inspection Performed  RLE Inspected  LLE Inspection Performed  Nails and Digits Inspection Performed                 Diagram Legend     Erythematous scaling macule/papule c/w actinic keratosis       Vascular papule c/w angioma      Pigmented verrucoid papule/plaque c/w seborrheic keratosis      Yellow umbilicated papule c/w sebaceous hyperplasia      Irregularly shaped tan macule c/w lentigo     1-2 mm smooth white papules consistent with Milia      Movable subcutaneous cyst with punctum c/w epidermal inclusion cyst      Subcutaneous movable cyst c/w pilar  cyst      Firm pink to brown papule c/w dermatofibroma      Pedunculated fleshy papule(s) c/w skin tag(s)      Evenly pigmented macule c/w junctional nevus     Mildly variegated pigmented, slightly irregular-bordered macule c/w mildly atypical nevus      Flesh colored to evenly pigmented papule c/w intradermal nevus       Pink pearly papule/plaque c/w basal cell carcinoma      Erythematous hyperkeratotic cursted plaque c/w SCC      Surgical scar with no sign of skin cancer recurrence      Open and closed comedones      Inflammatory papules and pustules      Verrucoid papule consistent consistent with wart     Erythematous eczematous patches and plaques     Dystrophic onycholytic nail with subungual debris c/w onychomycosis     Umbilicated papule    Erythematous-base heme-crusted tan verrucoid plaque consistent with inflamed seborrheic keratosis     Erythematous Silvery Scaling Plaque c/w Psoriasis     See annotation      Assessment / Plan:        AK (actinic keratosis)  Cryosurgery Procedure Note    Verbal consent from the patient is obtained including, but not limited to, risk of hypopigmentation/hyperpigmentation, scar, recurrence of lesion. The patient is aware of the precancerous quality and need for treatment of these lesions. Liquid nitrogen cryosurgery is applied to the 2 actinic keratoses, as detailed in the physical exam, to produce a freeze injury. The patient is aware that blisters may form and is instructed on wound care with gentle cleansing and use of vaseline ointment to keep moist until healed. The patient is supplied a handout on cryosurgery and is instructed to call if lesions do not completely resolve.    Cherry angioma  This is a benign vascular lesion. Reassurance given. No treatment required.     Folliculitis  -     clindamycin (CLEOCIN T) 1 % external solution; AAA bid  Dispense: 60 mL; Refill: 3  - wash with sal acid shampoo or benzyl peroxide daily  while infection is present, then 3 x a  week  - apply clindamycin solution after shaving or to affected area daily          Multiple benign nevi  TBSE body skin examination performed today including at least 12 points as noted in physical examination. No lesions suspicious for malignancy noted.  Reassurance provided.  Instructed patient to observe lesion(s) for changes and follow up in clinic if changes are noted. Discussed ABCDE's of moles and brochure provided.    Stucco keratosis  These are benign inherited growths without a malignant potential. Reassurance given to patient. No treatment is necessary.      Screening exam for skin cancer  Total body skin examination performed today including at least 12 points as noted in physical examination. No lesions suspicious for malignancy noted.    Recommend daily sun protection/avoidance and use of at least SPF 30, broad spectrum sunscreen (OTC drug).            F/u 1 year TBSE    No follow-ups on file.

## 2023-09-25 NOTE — PATIENT INSTRUCTIONS
Folliculitis  -     clindamycin (CLEOCIN T) 1 % external solution; AAA bid  Dispense: 60 mL; Refill: 3  - wash with sal acid shampoo or benzyl peroxide daily  while infection is present, then 3 x a week  - apply clindamycin solution after shaving or to affected area daily

## 2023-11-29 NOTE — PROGRESS NOTES
[facility]      TODAY'S Date 12/01/2023  Patient ID: Oneal Reina is a 63 y.o. male   MRN: [id number]  Primary Physician: Sofia, Primary Doctor    SUBJECTIVE     Chief Complaint:   Chief Complaint   Patient presents with    [facility]    Cervical Raduculopathy    Shouler Pain    Low-back Pain     HPI:   Reviewed medical, surgical, social and family history, medications, appropriate preventive health screenings, as well as vaccination history. Updates as noted below or in assessment and plan.    This is a very pleasant 63 y.o. nonsmoking, LHD male with PMHx RCC s/p partial L nephrectomy, GERD who is new patient to me presenting for an annual exam in [facility]. Patient reports that he intermittently has paraesthesia in in R arm that has occurred since of April 2023.  He states it occurred while he was rowing on machine for exercise. He has had intermittent R shoulder pain as well.  He has had LBP since Dec 2022. The pain is not currently present.  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 He sometimes has to come to work early to pre-chart and stay late to finish.  He typically goes to bed at 9:30pm and wakes up at 5:30am. For the last 6 months, he rises up at 3 am for no particular reason.  He is concerned that he may have worsening sleep apnea as his wife says he snores. He previously used CPAP machine but has stopped because he does not like to clean it.  He did not snore when he weighed < 200 lbs.  His goal for 2024 is to weigh 185lbs. He typically eats nothing for breakfast.  Lunch is whatever is in the doctor's lounge.  Usually, dinner consists of a home-cooked meal meat and vegetable that his wife cooks.  As his wife is currently out town patient has been eating fast food.  Snacks are chips, pretezels, cakes, or gummy  bears. Patient does not feel like he has a lot of free time.  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  Prior to taking fluoxetine, he would get really worked up when giving talks.  Now there is less anxiety around it. Functionally, the patient does not report limitations due to his current symptoms and is wondering if he should stop the medication. He previously was on a Pepcid for GERD and Cetirizine for allergies but stopped taking both because he read that they may cause Alzheimer's. He reports that he had only one episode Gout years ago.  The aspirate shows high normal value of uric acid.  Patient has considered stopping his allopurinol but is worried about the pain returning. Patient reports that he was previously being evaluated by CT urogram but was worried the contrast would hurt his kidneys or the repeated radiation would induce cancer.  He received an US that was unremarkable thereafter.  He is open to receiving the Zoster vaccine at this time. He is wondering if he needs to be vaccinated for Hepatitis B as he was told that his titers for immunity were 0.  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  There is no report of any cough, sneezing, tingling, weakness, clumsiness, numbness or loss of bowel or bladder control.     SCREENING:   Prostate:   UTD, He understands PSA is a screening blood test. We discussed about the pros and cons of PSA testing for prostate cancer screening   Colonoscopy:             UTD, last in Nov 2021. Next in 10 years.  Depression:   negative   Anxiety:  KAYLEY-7 5 points indicates Mild anxiety disorder.  Eye Exam:                   last in Oct 2023  Dental exam:                routine, last at [facility] Oct 2022  Transportation safety:  Uses restraint consistently, does not drink alcohol immediately prior or during driving  Tobacco use:    none    A review of medical  records indicates Specialists:  Dermatology - Kiya Day MD              Allergy - Donald Welsh MD  Optometry - Aurea Sheikh OD  Urology - Fabiana Saxena, ADITI    Immunization History   Administered Date(s) Administered    COVID-19, MRNA, LN-S, PF (Pfizer) (Purple Cap) 12/15/2020, 01/05/2021, 09/18/2021    COVID-19, mRNA, LNP-S, PF (Moderna 2023)Ages 12+ 10/24/2023    Hepatitis B (recombinant) Adjuvanted, 2 dose 12/15/2022    Influenza - Quadrivalent 09/28/2018, 11/07/2019, 10/22/2020, 10/04/2021    Influenza - Quadrivalent - PF *Preferred* (6 months and older) 09/15/2023    Influenza - Trivalent (ADULT) 12/31/2013, 10/15/2014, 11/01/2015, 10/21/2016    Pneumococcal Polysaccharide - 23 Valent 09/14/2023    RSVpreF (Arexvy) 10/24/2023    Tdap 11/19/2018, 12/08/2022    Zoster Recombinant 12/08/2022, 11/30/2023     Past Medical History:   Diagnosis Date    Allergic rhinitis     Anxiety     DDD (degenerative disc disease), cervical     GERD (gastroesophageal reflux disease)     Gout     History of shingles     Hypertriglyceridemia     Reactive airway disease     Renal cell carcinoma     Right, s/p partial nephrectomy     Past Surgical History:   Procedure Laterality Date    ADENOIDECTOMY      COLONOSCOPY  2021    Normal    ESOPHAGOGASTRODUODENOSCOPY      PARTIAL NEPHRECTOMY Right     RCC    SMALL INTESTINE SURGERY      Volvulus, SBO    TONSILLECTOMY       Family History   Problem Relation Age of Onset    Breast cancer Mother     Asthma Father     Allergies Father     Testicular cancer Father     Pancreatic cancer Father     Allergies Brother     Asthma Brother     Colon polyps Brother     Brain cancer Paternal Grandmother     Asthma Daughter     Allergies Daughter     Asthma Son     Allergies Son     Colon cancer Neg Hx     Melanoma Neg Hx      Social History     Tobacco Use    Smoking status: Never     Passive exposure: Past    Smokeless tobacco: Never   Substance Use Topics    Alcohol  "use: Yes     Comment: Rarely    Drug use: Not Currently     Past Medical, Surgical and Social history reviewed and verified by me.     Review of patient's allergies indicates:  No Known Allergies    Current Outpatient Medications on File Prior to Visit   Medication Sig Dispense Refill    albuterol (PROVENTIL/VENTOLIN HFA) 90 mcg/actuation inhaler Inhale 1-2 puffs into the lungs every 6 (six) hours as needed.      allopurinoL (ZYLOPRIM) 300 MG tablet Take 1 tablet (300 mg total) by mouth once daily. 90 tablet 3    CYANOCOBALAMIN, VITAMIN B-12, ORAL Take 5,000 mcg by mouth.      FLUoxetine 20 MG capsule Take 1 capsule (20 mg total) by mouth once daily. 90 capsule 3    fluticasone propionate (FLONASE) 50 mcg/actuation nasal spray 1 spray by Nasal route.       No current facility-administered medications on file prior to visit.       Review of Systems as per HPI  ROS  Constitutional: Negative for activity change, appetite change, fatigue, diaphoresis, chills and fever.   Respiratory: Negative for apnea, choking, chest tightness and wheezing.  Genitourinary: Negative for hematuria, flank pain, frequency and dysuria.   Skin: Negative for color change, pallor, rash and wound.     All other systems reviewed and are negative.    OBJECTIVE     PHYSICAL EXAM  Vitals:    11/30/23 1355   BP: 128/80   Pulse: 93   SpO2: 97%   Weight: 102.2 kg (225 lb 5 oz)   Height: 5' 9" (1.753 m)     Vital Signs (Most Recent):  Pulse: 93 (11/30/23 1355)  BP: 128/80 (11/30/23 1355)  SpO2: 97 % (11/30/23 1355)   Weight:   Wt Readings from Last 1 Encounters:   11/30/23 102.2 kg (225 lb 5 oz)     Body mass index is 33.27 kg/m².      Vital signs and nursing assessment noted: relatively normal vitals    GEN:   NAD, A & Ox3, atraumatic, well appearing, nontoxic appearing, obese  Physical Exam  Psychiatric:         Attention and Perception: Attention and perception normal.         Mood and Affect: Mood is anxious. Mood is not depressed or elated. Affect " is not labile, blunt, flat, angry, tearful or inappropriate.         Speech: Speech normal.         Behavior: Behavior normal. Behavior is cooperative.         Thought Content: Thought content normal.         Cognition and Memory: Cognition and memory normal.         Judgment: Judgment normal.     HEENT:  PERRLA, EOMI, moist membranes, nl conjunctiva, no scleral icterus, no nystagmus, no nodes/nodules, soft, supple, FROM, no trachial deviation, nexus negative  CV:   RRR no m/r/g, 2+ radial pulses, <2sec cap refill, no obvious JVD  RESP:  CTA B, no w/r/r, equal and bilateral chest rise, no respiratory distress  ABD:   soft, Nontender, Nondistended, +BS, no guarding/rebound  :   Deferred  BACK:  FROM, no midline tenderness, no paraspinal tenderness  EXT:   FROM, WHITEHEAD x 4, no swelling, no edema, no calf tenderness, no bony tenderness, no warmth or redness, no crepitus, no obvious deformity  LYMPH:  no gross adenopathy  NEURO:  GCS 15, CN II-XII grossly intact, no obvious motor/sensory deficit, no tremor, negative Romberg,  nl gait/coordination  SKIN:  Warm, dry, intact, no rashes/lesions or masses, nl color, no pallor    Tests    Labs reviewed and independently interpreted   Recent Results (from the past 2016 hour(s))   Comprehensive metabolic panel    Collection Time: 11/30/23 10:19 AM   Result Value Ref Range    Sodium 146 (H) 136 - 145 mmol/L    Potassium 4.5 3.5 - 5.1 mmol/L    Chloride 106 95 - 110 mmol/L    CO2 28 23 - 29 mmol/L    Glucose 98 70 - 110 mg/dL    BUN 16 8 - 23 mg/dL    Creatinine 1.4 0.5 - 1.4 mg/dL    Calcium 9.9 8.7 - 10.5 mg/dL    Total Protein 7.4 6.0 - 8.4 g/dL    Albumin 4.3 3.5 - 5.2 g/dL    Total Bilirubin 0.8 0.1 - 1.0 mg/dL    Alkaline Phosphatase 82 55 - 135 U/L    AST 19 10 - 40 U/L    ALT 20 10 - 44 U/L    eGFR 56.8 (A) >60 mL/min/1.73 m^2    Anion Gap 12 8 - 16 mmol/L   CBC Without Differential    Collection Time: 11/30/23 10:19 AM   Result Value Ref Range    WBC 5.71 3.90 - 12.70  K/uL    RBC 5.49 4.60 - 6.20 M/uL    Hemoglobin 16.9 14.0 - 18.0 g/dL    Hematocrit 49.8 40.0 - 54.0 %    MCV 91 82 - 98 fL    MCH 30.8 27.0 - 31.0 pg    MCHC 33.9 32.0 - 36.0 g/dL    RDW 12.4 11.5 - 14.5 %    Platelets 184 150 - 450 K/uL    MPV 10.0 9.2 - 12.9 fL   Lipid panel    Collection Time: 11/30/23 10:19 AM   Result Value Ref Range    Cholesterol 230 (H) 120 - 199 mg/dL    Triglycerides 173 (H) 30 - 150 mg/dL    HDL 52 40 - 75 mg/dL    LDL Cholesterol 143.4 63.0 - 159.0 mg/dL    HDL/Cholesterol Ratio 22.6 20.0 - 50.0 %    Total Cholesterol/HDL Ratio 4.4 2.0 - 5.0    Non-HDL Cholesterol 178 mg/dL   TSH    Collection Time: 11/30/23 10:19 AM   Result Value Ref Range    TSH 1.132 0.400 - 4.000 uIU/mL   PSA, Screening (every year)    Collection Time: 11/30/23 10:19 AM   Result Value Ref Range    PSA, Screen 0.73 0.00 - 4.00 ng/mL   Hemoglobin A1c    Collection Time: 11/30/23 10:19 AM   Result Value Ref Range    Hemoglobin A1C 5.4 4.0 - 5.6 %    Estimated Avg Glucose 108 68 - 131 mg/dL         Latest Reference Range & Units 12/08/22 07:28   Prostate Specific Antigen 0.00 - 4.00 ng/mL 0.65     MRI Brain Feb 2022 IMPRESSION:   1. No acute intracranial abnormality.   2. Normal appearance of the internal auditory canals.     US PENILE DUPLEX  June 2020 IMPRESSION(S)                       1. Peyronies plaque identified , small amount of scattered plaque visualized mid shaft                    2. Curvature:  35 degree DORSAL curvature measured with a GONIOMETER, apex of the curve is located approximately 2.5 cm from the coronal sulcus         CV cath lab procedure Jan 2019 Impression  1. Mild luminal irregularities in the coronary arteries without significant stenosis.  2. Mildly elevated left heart filling pressures.    CV Stress test Dec 2018 --Impressions-Abnormal test suggestive of ischemia     Echo Nov 2018 FINDINGS:   LV:      LV size is normal. LV EF is normal. Overall wall motion is normal.  Estimated EF is  60-64%.   RV:       RV size is normal. RV systolic function is normal.   LA:       LA size is normal.   RA:       RA size is normal.   AO:       Aortic root diameter is normal.   PEPPER:     No pericardial effusion.   AV:       Focal calcification of AV leaflets.   MV:       No structural MV abnormalities noted.   PV:       No structural PV abnormalities noted.   TV:       No structural TV abnormalities noted.   Zhang:     LV relaxation is normal. LV filling pressure is normal.   Other:    Insufficient TR jet to estimate PA systolic pressure.     CT chest Nov 2018 IMPRESSION:   1. No CT evidence of pulmonary emboli.   2. Minimal left lower lobe atelectasis, otherwise the lungs are clear.     US Kidney March 2017 Impression  RIGHT - Normal study  LEFT - Post surgical changes    NM TUMOR WHOLE BODY W OCTREOSCAN  Sept 2016 Impression: No scan evidence of neuroendocrine tumor or metastases.     Dexa March 2016 Impression:   1. Osteopenia of the left hip, fracture risk is increased   2. Normal bone mineral density of the lumbar spine, fracture risk is not increased.     MRI lumbar spine March 2016 IMPRESSION:   1. Mildly degenerated disks from L1 to L5, moderate at L5-S1.   2.  No Modic type I changes along the endplates.   3.  Mild bilateral facet arthrosis at L3-4 and L4-5 without synovitis   4.  Mild right foraminal stenosis at L4-5 due to an asymmetric disc bulge.   5.  Otherwise, no significant spinal canal or foraminal stenosis. No disc herniations     MRI thoracic spine March 2016 IMPRESSION:   1. Mildly degenerated discs from T8 to L1. Disc osteophyte complexes from T8 to T11 do not result in spinal canal stenosis.   2.  No additional abnormalities.     ASSESSMENT:     1. Annual physical exam    2. Renal cell carcinoma, unspecified laterality    3. Osteopenia, unspecified location    4. Class 1 obesity with body mass index (BMI) of 33.0 to 33.9 in adult, unspecified obesity type, unspecified whether serious  comorbidity present      63 y.o. male PMHx RCC s/p partial L nephrectomy, Gout on allopurinol, GERD, Anxiety on fluoxetine, DDD presents with resolving paresthesias, arthralgias, and persistent LBP. Symptoms seem to be more pronounced as of April 2023. He also has concerns on whether he should continue to take some medications.  Other than mild anxiety, exam is relatively benign and nonfocal. No lesions suspicious for malignancy noted. Immunization status: missing dose zoster.  Scheduled cancer screenings UTD    MDM  Reviewed: previous chart, nursing note and vitals  Reviewed previous: labs, MRI, CT scan and ultrasound  Interpretation: labs (elevated trig, otherwise relatively unremarkable Lipid Panel, HgA1c, CMP, CBC, TSH, PSA)      PLAN:   We suggest periodic health maintenance annually for adults ?50 years or sooner with concerns  Routine labs CMP, CBC, Lipid, HgA1C, TSH, PSA     Orders Placed This Encounter   Procedures    (In Office Administered) Zoster Recombinant Vaccine    Hepatitis B Surface Antibody, Qual/Quant    Ambulatory referral/consult to Physical/Occupational Therapy    Ambulatory referral/consult to Urology    Ambulatory referral/consult to [facility]    [facility]      New Prescriptions    OMEGA-3 FATTY ACIDS 1,000 MG CAP    Take 1 capsule (1,000 mg total) by mouth Daily.   Continue current medications including but not exclusive to fluoxetine. Patient to discuss cessation of other medications with PCP.  Consider Daily calcium intake 5921-5041 mg, Vitamin D 3198-1135 IU daily.  Encouraged healthy eating, exercise and weight management. Patient will try working out in the morning  Recommend 150 minutes of moderate-intensity physical activity and 2 days of muscle strengthening activity weekly.  Recommend daily sun protection/avoidance, use of at least SPF 30, broad spectrum sunscreen, and routine physician surveillance to optimize early detection      The results of  physical exam findings, labs, and imaging were reviewed with the patient. Management of above assessments/visit diagnoses was discussed with patient.   Precautions for return discussed at length. Patient was given ample time for questions. All questions asked and answered to the satisfaction of the patient. Patient is in agreement with the above and verbalized understanding. Spent 7 minutes on learning to recognize ones thinking and then to reevaluate them in light of reality and gaining a better understanding of motivation.Total time spent caring for the patient today was 46 minutes. This includes time spent before the visit reviewing the chart, time spent during the visit, and time spent after the visit on documentation.    Roger Spnagler MD  [facility]

## 2023-11-30 ENCOUNTER — CLINICAL SUPPORT (OUTPATIENT)
Dept: INTERNAL MEDICINE | Facility: CLINIC | Age: 63
End: 2023-11-30
Payer: COMMERCIAL

## 2023-11-30 ENCOUNTER — OFFICE VISIT (OUTPATIENT)
Dept: INTERNAL MEDICINE | Facility: CLINIC | Age: 63
End: 2023-11-30
Payer: COMMERCIAL

## 2023-11-30 ENCOUNTER — PATIENT MESSAGE (OUTPATIENT)
Dept: ADMINISTRATIVE | Facility: OTHER | Age: 63
End: 2023-11-30
Payer: COMMERCIAL

## 2023-11-30 VITALS
SYSTOLIC BLOOD PRESSURE: 128 MMHG | HEIGHT: 69 IN | OXYGEN SATURATION: 97 % | DIASTOLIC BLOOD PRESSURE: 80 MMHG | HEART RATE: 93 BPM | BODY MASS INDEX: 33.37 KG/M2 | WEIGHT: 225.31 LBS

## 2023-11-30 DIAGNOSIS — M85.80 OSTEOPENIA, UNSPECIFIED LOCATION: ICD-10-CM

## 2023-11-30 DIAGNOSIS — C64.9 RENAL CELL CARCINOMA, UNSPECIFIED LATERALITY: ICD-10-CM

## 2023-11-30 DIAGNOSIS — E66.9 CLASS 1 OBESITY WITH BODY MASS INDEX (BMI) OF 33.0 TO 33.9 IN ADULT, UNSPECIFIED OBESITY TYPE, UNSPECIFIED WHETHER SERIOUS COMORBIDITY PRESENT: ICD-10-CM

## 2023-11-30 DIAGNOSIS — Z00.00 ANNUAL PHYSICAL EXAM: Primary | ICD-10-CM

## 2023-11-30 DIAGNOSIS — R76.0 ABNORMAL ANTIBODY TITER: ICD-10-CM

## 2023-11-30 DIAGNOSIS — Z00.00 ANNUAL PHYSICAL EXAM: ICD-10-CM

## 2023-11-30 LAB
ALBUMIN SERPL BCP-MCNC: 4.3 G/DL (ref 3.5–5.2)
ALP SERPL-CCNC: 82 U/L (ref 55–135)
ALT SERPL W/O P-5'-P-CCNC: 20 U/L (ref 10–44)
ANION GAP SERPL CALC-SCNC: 12 MMOL/L (ref 8–16)
AST SERPL-CCNC: 19 U/L (ref 10–40)
BILIRUB SERPL-MCNC: 0.8 MG/DL (ref 0.1–1)
BUN SERPL-MCNC: 16 MG/DL (ref 8–23)
CALCIUM SERPL-MCNC: 9.9 MG/DL (ref 8.7–10.5)
CHLORIDE SERPL-SCNC: 106 MMOL/L (ref 95–110)
CHOLEST SERPL-MCNC: 230 MG/DL (ref 120–199)
CHOLEST/HDLC SERPL: 4.4 {RATIO} (ref 2–5)
CO2 SERPL-SCNC: 28 MMOL/L (ref 23–29)
COMPLEXED PSA SERPL-MCNC: 0.73 NG/ML (ref 0–4)
CREAT SERPL-MCNC: 1.4 MG/DL (ref 0.5–1.4)
ERYTHROCYTE [DISTWIDTH] IN BLOOD BY AUTOMATED COUNT: 12.4 % (ref 11.5–14.5)
EST. GFR  (NO RACE VARIABLE): 56.8 ML/MIN/1.73 M^2
ESTIMATED AVG GLUCOSE: 108 MG/DL (ref 68–131)
GLUCOSE SERPL-MCNC: 98 MG/DL (ref 70–110)
HBA1C MFR BLD: 5.4 % (ref 4–5.6)
HCT VFR BLD AUTO: 49.8 % (ref 40–54)
HDLC SERPL-MCNC: 52 MG/DL (ref 40–75)
HDLC SERPL: 22.6 % (ref 20–50)
HGB BLD-MCNC: 16.9 G/DL (ref 14–18)
LDLC SERPL CALC-MCNC: 143.4 MG/DL (ref 63–159)
MCH RBC QN AUTO: 30.8 PG (ref 27–31)
MCHC RBC AUTO-ENTMCNC: 33.9 G/DL (ref 32–36)
MCV RBC AUTO: 91 FL (ref 82–98)
NONHDLC SERPL-MCNC: 178 MG/DL
PLATELET # BLD AUTO: 184 K/UL (ref 150–450)
PMV BLD AUTO: 10 FL (ref 9.2–12.9)
POTASSIUM SERPL-SCNC: 4.5 MMOL/L (ref 3.5–5.1)
PROT SERPL-MCNC: 7.4 G/DL (ref 6–8.4)
RBC # BLD AUTO: 5.49 M/UL (ref 4.6–6.2)
SODIUM SERPL-SCNC: 146 MMOL/L (ref 136–145)
TRIGL SERPL-MCNC: 173 MG/DL (ref 30–150)
TSH SERPL DL<=0.005 MIU/L-ACNC: 1.13 UIU/ML (ref 0.4–4)
WBC # BLD AUTO: 5.71 K/UL (ref 3.9–12.7)

## 2023-11-30 PROCEDURE — 3008F BODY MASS INDEX DOCD: CPT | Mod: CPTII,S$GLB,, | Performed by: EMERGENCY MEDICINE

## 2023-11-30 PROCEDURE — 80053 COMPREHEN METABOLIC PANEL: CPT | Performed by: EMERGENCY MEDICINE

## 2023-11-30 PROCEDURE — 3044F HG A1C LEVEL LT 7.0%: CPT | Mod: CPTII,S$GLB,, | Performed by: EMERGENCY MEDICINE

## 2023-11-30 PROCEDURE — 1159F PR MEDICATION LIST DOCUMENTED IN MEDICAL RECORD: ICD-10-PCS | Mod: CPTII,S$GLB,, | Performed by: EMERGENCY MEDICINE

## 2023-11-30 PROCEDURE — 1159F MED LIST DOCD IN RCRD: CPT | Mod: CPTII,S$GLB,, | Performed by: EMERGENCY MEDICINE

## 2023-11-30 PROCEDURE — 3074F SYST BP LT 130 MM HG: CPT | Mod: CPTII,S$GLB,, | Performed by: EMERGENCY MEDICINE

## 2023-11-30 PROCEDURE — 99999 PR PBB SHADOW E&M-EST. PATIENT-LVL V: CPT | Mod: PBBFAC,,, | Performed by: EMERGENCY MEDICINE

## 2023-11-30 PROCEDURE — 3079F DIAST BP 80-89 MM HG: CPT | Mod: CPTII,S$GLB,, | Performed by: EMERGENCY MEDICINE

## 2023-11-30 PROCEDURE — 84443 ASSAY THYROID STIM HORMONE: CPT | Performed by: EMERGENCY MEDICINE

## 2023-11-30 PROCEDURE — 3079F PR MOST RECENT DIASTOLIC BLOOD PRESSURE 80-89 MM HG: ICD-10-PCS | Mod: CPTII,S$GLB,, | Performed by: EMERGENCY MEDICINE

## 2023-11-30 PROCEDURE — 80061 LIPID PANEL: CPT | Performed by: EMERGENCY MEDICINE

## 2023-11-30 PROCEDURE — 99999 PR PBB SHADOW E&M-EST. PATIENT-LVL I: CPT | Mod: PBBFAC,,,

## 2023-11-30 PROCEDURE — 3044F PR MOST RECENT HEMOGLOBIN A1C LEVEL <7.0%: ICD-10-PCS | Mod: CPTII,S$GLB,, | Performed by: EMERGENCY MEDICINE

## 2023-11-30 PROCEDURE — 83036 HEMOGLOBIN GLYCOSYLATED A1C: CPT | Performed by: EMERGENCY MEDICINE

## 2023-11-30 PROCEDURE — 3074F PR MOST RECENT SYSTOLIC BLOOD PRESSURE < 130 MM HG: ICD-10-PCS | Mod: CPTII,S$GLB,, | Performed by: EMERGENCY MEDICINE

## 2023-11-30 PROCEDURE — 90471 ZOSTER RECOMBINANT VACCINE: ICD-10-PCS | Mod: S$GLB,,, | Performed by: EMERGENCY MEDICINE

## 2023-11-30 PROCEDURE — 90750 HZV VACC RECOMBINANT IM: CPT | Mod: S$GLB,,, | Performed by: EMERGENCY MEDICINE

## 2023-11-30 PROCEDURE — 36415 COLL VENOUS BLD VENIPUNCTURE: CPT | Performed by: EMERGENCY MEDICINE

## 2023-11-30 PROCEDURE — 86706 HEP B SURFACE ANTIBODY: CPT | Performed by: EMERGENCY MEDICINE

## 2023-11-30 PROCEDURE — 99215 PR OFFICE/OUTPT VISIT, EST, LEVL V, 40-54 MIN: ICD-10-PCS | Mod: S$GLB,,, | Performed by: EMERGENCY MEDICINE

## 2023-11-30 PROCEDURE — 3008F PR BODY MASS INDEX (BMI) DOCUMENTED: ICD-10-PCS | Mod: CPTII,S$GLB,, | Performed by: EMERGENCY MEDICINE

## 2023-11-30 PROCEDURE — 90471 IMMUNIZATION ADMIN: CPT | Mod: S$GLB,,, | Performed by: EMERGENCY MEDICINE

## 2023-11-30 PROCEDURE — 99215 OFFICE O/P EST HI 40 MIN: CPT | Mod: S$GLB,,, | Performed by: EMERGENCY MEDICINE

## 2023-11-30 PROCEDURE — 99999 PR PBB SHADOW E&M-EST. PATIENT-LVL I: ICD-10-PCS | Mod: PBBFAC,,,

## 2023-11-30 PROCEDURE — 84153 ASSAY OF PSA TOTAL: CPT | Performed by: EMERGENCY MEDICINE

## 2023-11-30 PROCEDURE — 85027 COMPLETE CBC AUTOMATED: CPT | Performed by: EMERGENCY MEDICINE

## 2023-11-30 PROCEDURE — 86317 IMMUNOASSAY INFECTIOUS AGENT: CPT | Performed by: ALLERGY & IMMUNOLOGY

## 2023-11-30 PROCEDURE — 90750 ZOSTER RECOMBINANT VACCINE: ICD-10-PCS | Mod: S$GLB,,, | Performed by: EMERGENCY MEDICINE

## 2023-11-30 PROCEDURE — 99999 PR PBB SHADOW E&M-EST. PATIENT-LVL V: ICD-10-PCS | Mod: PBBFAC,,, | Performed by: EMERGENCY MEDICINE

## 2023-11-30 RX ORDER — OMEGA-3 FATTY ACIDS 1000 MG
1 CAPSULE ORAL DAILY
Qty: 90 CAPSULE | Refills: 3 | Status: SHIPPED | OUTPATIENT
Start: 2023-11-30 | End: 2024-11-29

## 2023-12-01 ENCOUNTER — PATIENT OUTREACH (OUTPATIENT)
Dept: OTHER | Facility: OTHER | Age: 63
End: 2023-12-01
Payer: COMMERCIAL

## 2023-12-01 NOTE — PROGRESS NOTES
Connected Back: Patient Outreach    Welcome Call - Spoke with a lady that explained patient was at work and Monday would be a good day to call back. Set call back for 12/4.

## 2023-12-04 ENCOUNTER — PATIENT OUTREACH (OUTPATIENT)
Dept: OTHER | Facility: OTHER | Age: 63
End: 2023-12-04
Payer: COMMERCIAL

## 2023-12-04 LAB
HBV SURFACE AB SER QL IA: POSITIVE
HBV SURFACE AB SERPL IA-ACNC: 25 MIU/ML

## 2023-12-04 NOTE — PROGRESS NOTES
Connected Back: Patient Outreach    Welcome Call - Patient was unable to talk due to family emergency. Re-quested call back on 12/15.

## 2023-12-06 LAB
DEPRECATED S PNEUM12 IGG SER-MCNC: 1.3 UG/ML
DEPRECATED S PNEUM23 IGG SER-MCNC: <0.3 UG/ML
DEPRECATED S PNEUM4 IGG SER-MCNC: <0.3 UG/ML
DEPRECATED S PNEUM8 IGG SER-MCNC: 1.9 UG/ML
DEPRECATED S PNEUM9 IGG SER-MCNC: 0.7 UG/ML
S PN DA SERO 19F IGG SER-MCNC: 1.7 UG/ML
S PNEUM DA 1 IGG SER-MCNC: 4.3 UG/ML
S PNEUM DA 14 IGG SER-MCNC: <0.3
S PNEUM DA 18C IGG SER-MCNC: <0.3
S PNEUM DA 3 IGG SER-MCNC: 4.3 UG/ML
S PNEUM DA 5 IGG SER-MCNC: <0.3 UG/ML
S PNEUM DA 6B IGG SER-MCNC: 0.3 UG/ML
S PNEUM DA 7F IGG SER-MCNC: <0.3 UG/ML
S PNEUM DA 9V IGG SER-MCNC: <0.3 UG/ML

## 2023-12-07 ENCOUNTER — PATIENT MESSAGE (OUTPATIENT)
Dept: ALLERGY | Facility: CLINIC | Age: 63
End: 2023-12-07
Payer: COMMERCIAL

## 2023-12-07 DIAGNOSIS — R76.0 ABNORMAL ANTIBODY TITER: Primary | ICD-10-CM

## 2023-12-12 ENCOUNTER — PATIENT OUTREACH (OUTPATIENT)
Dept: OTHER | Facility: OTHER | Age: 63
End: 2023-12-12
Payer: COMMERCIAL

## 2023-12-15 ENCOUNTER — PATIENT OUTREACH (OUTPATIENT)
Dept: OTHER | Facility: OTHER | Age: 63
End: 2023-12-15
Payer: COMMERCIAL

## 2023-12-15 NOTE — PROGRESS NOTES
Connected Back: Patient Outreach    Welcome Call - Left VM. Re-quested call back with any questions. 3rd call made to patient.

## 2023-12-28 ENCOUNTER — PATIENT OUTREACH (OUTPATIENT)
Dept: OTHER | Facility: OTHER | Age: 63
End: 2023-12-28
Payer: COMMERCIAL

## 2023-12-29 ENCOUNTER — PATIENT MESSAGE (OUTPATIENT)
Dept: ALLERGY | Facility: CLINIC | Age: 63
End: 2023-12-29
Payer: COMMERCIAL

## 2023-12-29 ENCOUNTER — PATIENT OUTREACH (OUTPATIENT)
Dept: OTHER | Facility: OTHER | Age: 63
End: 2023-12-29
Payer: COMMERCIAL

## 2023-12-29 NOTE — PROGRESS NOTES
Connected Back: Patient Outreach    Patient requested removal due to busy schedule. Closing out program episode.

## 2024-01-03 DIAGNOSIS — R76.0 ABNORMAL ANTIBODY TITER: Primary | ICD-10-CM

## 2024-01-04 ENCOUNTER — TELEPHONE (OUTPATIENT)
Dept: ALLERGY | Facility: CLINIC | Age: 64
End: 2024-01-04
Payer: COMMERCIAL

## 2024-01-04 NOTE — TELEPHONE ENCOUNTER
Per Dr. Welsh, morenaregfernanda message. Patient was able to get the Prevnar 20 at the pharmacy with a 45.00 co pay.        Franchesca Sam,   Could you please contact Dr. Collins and schedule him for Prevnar 20 in allergy clinic at his convenience (post titers 4-6 weeks later). PCW wouldn't give it.   Thank you!   LM

## 2024-01-12 ENCOUNTER — TELEPHONE (OUTPATIENT)
Dept: UROLOGY | Facility: CLINIC | Age: 64
End: 2024-01-12
Payer: COMMERCIAL

## 2024-01-12 NOTE — TELEPHONE ENCOUNTER
Booked Out (may not be necessary appt - pt had partial L neph in 2008 and all F/U at time showed no issues. LVM w/ my DNFFCB to r/s/f another time. TY)

## 2024-02-14 DIAGNOSIS — F41.9 ANXIETY: ICD-10-CM

## 2024-02-14 RX ORDER — FLUOXETINE HYDROCHLORIDE 20 MG/1
20 CAPSULE ORAL DAILY
Qty: 90 CAPSULE | Refills: 3 | Status: SHIPPED | OUTPATIENT
Start: 2024-02-14

## 2024-03-01 ENCOUNTER — LAB VISIT (OUTPATIENT)
Dept: LAB | Facility: HOSPITAL | Age: 64
End: 2024-03-01
Attending: ALLERGY & IMMUNOLOGY
Payer: COMMERCIAL

## 2024-03-01 DIAGNOSIS — R76.0 ABNORMAL ANTIBODY TITER: ICD-10-CM

## 2024-03-01 PROCEDURE — 86317 IMMUNOASSAY INFECTIOUS AGENT: CPT | Performed by: ALLERGY & IMMUNOLOGY

## 2024-03-01 PROCEDURE — 36415 COLL VENOUS BLD VENIPUNCTURE: CPT | Performed by: ALLERGY & IMMUNOLOGY

## 2024-03-08 LAB
DEPRECATED S PNEUM12 IGG SER-MCNC: 1.7 UG/ML
DEPRECATED S PNEUM23 IGG SER-MCNC: <0.3 UG/ML
DEPRECATED S PNEUM4 IGG SER-MCNC: 0.6 UG/ML
DEPRECATED S PNEUM8 IGG SER-MCNC: 1.9 UG/ML
DEPRECATED S PNEUM9 IGG SER-MCNC: 0.6 UG/ML
S PN DA SERO 19F IGG SER-MCNC: 2.8 UG/ML
S PNEUM DA 1 IGG SER-MCNC: 3.5 UG/ML
S PNEUM DA 14 IGG SER-MCNC: <0.3
S PNEUM DA 18C IGG SER-MCNC: 0.5
S PNEUM DA 3 IGG SER-MCNC: 2.4 UG/ML
S PNEUM DA 5 IGG SER-MCNC: <0.3 UG/ML
S PNEUM DA 6B IGG SER-MCNC: 0.5 UG/ML
S PNEUM DA 7F IGG SER-MCNC: 0.8 UG/ML
S PNEUM DA 9V IGG SER-MCNC: <0.3 UG/ML

## 2024-07-02 ENCOUNTER — OFFICE VISIT (OUTPATIENT)
Dept: PRIMARY CARE CLINIC | Facility: CLINIC | Age: 64
End: 2024-07-02
Payer: COMMERCIAL

## 2024-07-02 VITALS
WEIGHT: 222 LBS | OXYGEN SATURATION: 99 % | HEART RATE: 78 BPM | DIASTOLIC BLOOD PRESSURE: 76 MMHG | SYSTOLIC BLOOD PRESSURE: 136 MMHG | HEIGHT: 69 IN | BODY MASS INDEX: 32.88 KG/M2

## 2024-07-02 DIAGNOSIS — C64.9 RENAL CELL CARCINOMA, UNSPECIFIED LATERALITY: ICD-10-CM

## 2024-07-02 DIAGNOSIS — Z80.9 FAMILY HISTORY OF CANCER: ICD-10-CM

## 2024-07-02 DIAGNOSIS — J45.20 MILD INTERMITTENT ASTHMA WITHOUT COMPLICATION: ICD-10-CM

## 2024-07-02 DIAGNOSIS — M10.9 GOUT, UNSPECIFIED CAUSE, UNSPECIFIED CHRONICITY, UNSPECIFIED SITE: ICD-10-CM

## 2024-07-02 DIAGNOSIS — F41.9 ANXIETY: Primary | ICD-10-CM

## 2024-07-02 PROCEDURE — 3008F BODY MASS INDEX DOCD: CPT | Mod: CPTII,S$GLB,, | Performed by: INTERNAL MEDICINE

## 2024-07-02 PROCEDURE — 3075F SYST BP GE 130 - 139MM HG: CPT | Mod: CPTII,S$GLB,, | Performed by: INTERNAL MEDICINE

## 2024-07-02 PROCEDURE — 99999 PR PBB SHADOW E&M-EST. PATIENT-LVL III: CPT | Mod: PBBFAC,,, | Performed by: INTERNAL MEDICINE

## 2024-07-02 PROCEDURE — 99214 OFFICE O/P EST MOD 30 MIN: CPT | Mod: S$GLB,,, | Performed by: INTERNAL MEDICINE

## 2024-07-02 PROCEDURE — 3078F DIAST BP <80 MM HG: CPT | Mod: CPTII,S$GLB,, | Performed by: INTERNAL MEDICINE

## 2024-07-02 PROCEDURE — 1159F MED LIST DOCD IN RCRD: CPT | Mod: CPTII,S$GLB,, | Performed by: INTERNAL MEDICINE

## 2024-07-02 RX ORDER — FLUOXETINE HYDROCHLORIDE 20 MG/1
40 CAPSULE ORAL DAILY
Start: 2024-07-02

## 2024-07-02 NOTE — PROGRESS NOTES
[facility] Note    Chief Complaint      Chief Complaint   Patient presents with    [facility]    Annual Exam     History of Present Illness      Oneal Reina is a 64 y.o. male who presents today for chief complaint follow up chronic issues .     HPI  Previously seen in [facility], he had annual and fulll labs in Nov 2023     PMHx RCC s/p partial L nephrectomy in 2008, has has seen urology NP Hemanth in 2023. Dx in 2006 while he was working at [employer], says he completed surveillance scans x5 years but is concerned for recurrence and wants to discuss follow up with urology   Denies LUTS and says he can avoid nocturia by reducing fluids before bedtime   He also has hx of peyronie's disease. He had been on T injections and was having to get therapeutic phlebotomy     He saw Derm 9.23 TBSE     Dx with KAYLEY after an episode of dyspnea at home, this translated to a full ACS work up with a Failed ETT and had LHC which was ultimately normal. Attributed anxiety largely to work stress. He has been on fluoxetine and felt well, notably less tearfulness but does think his anxiety is uncontrolled in that he still feels anxious at times but does not interfere with his day to day functioning. He is amenable to trial of dose increase    Obesity weight is stable exercise as below    He has BRITT he has mild intermittent asthma - seasonal , he uses prn zyrtec + flonase, his allergies are qorse here than [place]     Gout episode x1 he had over 20 years ago and has been on urate lowering tx since, sounds like he had a prolonged flare as he was not bridged to allopurinol with concurrent colchicine use, discussed risks and benefits fo allopurinol cessation. 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, he's a pediatrician by training, CP and Autism   He waks up at 5am to work out every day, he also does strength training   he wakes 2h before he needs to get get up to exercise, he is waking to urinate and cannot return to sleep  Mood as above    wife stays athome, son in [place], daughter works for [employer] in [place]   FHx primary pancreatic and tetsicualr cancer in dad, breast cancer in mom, brain cancer in PGM    Active Problem List with Overview Notes    Diagnosis Date Noted    Anxiety 07/03/2024    Gout 07/03/2024    Asthma, mild intermittent 07/03/2024    Class 1 obesity due to excess calories without serious comorbidity in adult 07/03/2024    Renal cell carcinoma, unspecified laterality 11/30/2023     Health Maintenance   Topic Date Due    PROSTATE-SPECIFIC ANTIGEN  11/30/2024    Lipid Panel  11/30/2028    Colorectal Cancer Screening  11/05/2031    TETANUS VACCINE  12/08/2032    Hepatitis C Screening  Completed    Shingles Vaccine  Completed       Past Medical History:   Diagnosis Date    Allergic rhinitis     Anxiety     DDD (degenerative disc disease), cervical     GERD (gastroesophageal reflux disease)     Gout     History of shingles     Hypertriglyceridemia     Reactive airway disease     Renal cell carcinoma     Right, s/p partial nephrectomy       Past Surgical History:   Procedure Laterality Date    ADENOIDECTOMY      COLONOSCOPY  2021    Normal    ESOPHAGOGASTRODUODENOSCOPY      PARTIAL NEPHRECTOMY Right     RCC    SMALL INTESTINE SURGERY      Volvulus, SBO    TONSILLECTOMY         family history includes Allergies in his brother, daughter, father, and son; Asthma in his brother, daughter, father, and son; Brain cancer in his paternal grandmother; Breast cancer in his mother; Cancer in his father, mother, and paternal grandmother; Colon polyps in his brother; Pancreatic cancer in his father; Testicular cancer in his father.     Social History  "    Tobacco Use    Smoking status: Never     Passive exposure: Past    Smokeless tobacco: Never   Substance Use Topics    Alcohol use: Not Currently     Comment: Rarely    Drug use: Never       Review of Systems   Constitutional:  Negative for chills, fever, malaise/fatigue and weight loss.   Respiratory:  Negative for cough, sputum production, shortness of breath and wheezing.    Cardiovascular:  Negative for chest pain, palpitations, orthopnea and leg swelling.   Gastrointestinal:  Negative for constipation, diarrhea, nausea and vomiting.   Genitourinary:  Negative for dysuria, frequency, hematuria and urgency.        Outpatient Encounter Medications as of 7/2/2024   Medication Sig Dispense Refill    albuterol (PROVENTIL/VENTOLIN HFA) 90 mcg/actuation inhaler Inhale 1-2 puffs into the lungs every 6 (six) hours as needed.      CYANOCOBALAMIN, VITAMIN B-12, ORAL Take 5,000 mcg by mouth.      fluticasone propionate (FLONASE) 50 mcg/actuation nasal spray 1 spray by Nasal route.      omega-3 fatty acids 1,000 mg Cap Take 1 capsule (1,000 mg total) by mouth Daily. 90 capsule 3    [DISCONTINUED] allopurinoL (ZYLOPRIM) 300 MG tablet Take 1 tablet (300 mg total) by mouth once daily. 90 tablet 3    [DISCONTINUED] FLUoxetine 20 MG capsule Take 1 capsule (20 mg total) by mouth once daily. 90 capsule 3    FLUoxetine 20 MG capsule Take 2 capsules (40 mg total) by mouth once daily.      [DISCONTINUED] pneumoc 20-vidhi conj-dip cr,PF, (PREVNAR-20, PF,) 0.5 mL Syrg injection Inject into the muscle. 0.5 mL 0     No facility-administered encounter medications on file as of 7/2/2024.       Review of patient's allergies indicates:  No Known Allergies      Physical Exam      Vital Signs  Pulse: 78  SpO2: 99 %  BP: 136/76  BP Location: Left arm  Patient Position: Sitting  Height and Weight  Height: 5' 9" (175.3 cm)  Weight: 100.7 kg (222 lb 0.1 oz)  BSA (Calculated - sq m): 2.21 sq meters  BMI (Calculated): 32.8  Weight in (lb) to have " "BMI = 25: 168.9]    Physical Exam  Vitals reviewed.   Constitutional:       Appearance: He is well-developed. He is not diaphoretic.   HENT:      Head: Normocephalic and atraumatic.      Right Ear: External ear normal.      Left Ear: External ear normal.   Eyes:      General: No scleral icterus.        Right eye: No discharge.         Left eye: No discharge.      Conjunctiva/sclera: Conjunctivae normal.   Cardiovascular:      Rate and Rhythm: Normal rate and regular rhythm.      Heart sounds: Normal heart sounds.   Pulmonary:      Effort: Pulmonary effort is normal. No respiratory distress.      Breath sounds: Normal breath sounds.   Abdominal:      General: There is no distension.      Palpations: There is no mass.      Tenderness: There is no abdominal tenderness.   Musculoskeletal:         General: Normal range of motion.      Cervical back: Normal range of motion.   Neurological:      Mental Status: He is alert and oriented to person, place, and time.   Psychiatric:         Behavior: Behavior normal.         Thought Content: Thought content normal.         Judgment: Judgment normal.          Laboratory:  CBC:  No results for input(s): "WBC", "RBC", "HGB", "HCT", "PLT", "MCV", "MCH", "MCHC" in the last 2160 hours.  CMP:  No results for input(s): "GLU", "CALCIUM", "ALBUMIN", "PROT", "NA", "K", "CO2", "CL", "BUN", "ALKPHOS", "ALT", "AST", "BILITOT" in the last 2160 hours.    Invalid input(s): "CREATININ"  URINALYSIS:  No results for input(s): "COLORU", "CLARITYU", "SPECGRAV", "PHUR", "PROTEINUA", "GLUCOSEU", "BILIRUBINCON", "BLOODU", "WBCU", "RBCU", "BACTERIA", "MUCUS", "NITRITE", "LEUKOCYTESUR", "UROBILINOGEN", "HYALINECASTS" in the last 2160 hours.   LIPIDS:  No results for input(s): "TSH", "HDL", "CHOL", "TRIG", "LDLCALC", "CHOLHDL", "NONHDLCHOL", "TOTALCHOLEST" in the last 2160 hours.  TSH:  No results for input(s): "TSH" in the last 2160 hours.  A1C:  No results for input(s): "HGBA1C" in the last 2160 " hours.      Assessment/Plan     Oneal Reina is a 64 y.o.male with:    1. Anxiety  Assessment & Plan:  Trial of increased fluoxetine 40mg     Orders:  -     FLUoxetine 20 MG capsule; Take 2 capsules (40 mg total) by mouth once daily.    2. Family history of cancer  -     Ambulatory referral/consult to Genetics; Future; Expected date: 07/10/2024    3. Renal cell carcinoma, unspecified laterality  Assessment & Plan:  Will discuss with Urol Onc Dr Saxena or Katie       4. Gout, unspecified cause, unspecified chronicity, unspecified site  Assessment & Plan:  Ok to cease urate lowering therapy, will notify me of flare      5. Mild intermittent asthma without complication  Assessment & Plan:  Continue prn dru use and continue antihistamine and nasal steroid           Use of the MyChart Patient Portal discussed and encouraged during today's visit  -Continue current medications and maintain follow up with specialists.  Return to clinic in .  Future Appointments   Date Time Provider Department Center   7/18/2024 10:00 AM Aurea Sheikh OD [facility] OPTOMTY [place]   8/23/2024  8:20 AM Kirstie Mcclendon MD [facility] DERM [place]   10/31/2024  9:40 AM Jeromy Berkowitz OD [facility] OPTOMTY [place] minerva De Souza MD  7/3/2024 11:36 AM    Primary Care Internal Medicine

## 2024-07-03 ENCOUNTER — PATIENT MESSAGE (OUTPATIENT)
Dept: PRIMARY CARE CLINIC | Facility: CLINIC | Age: 64
End: 2024-07-03
Payer: COMMERCIAL

## 2024-07-03 PROBLEM — E66.811 CLASS 1 OBESITY DUE TO EXCESS CALORIES WITHOUT SERIOUS COMORBIDITY IN ADULT: Status: ACTIVE | Noted: 2024-07-03

## 2024-07-03 PROBLEM — F32.1 CURRENT MODERATE EPISODE OF MAJOR DEPRESSIVE DISORDER WITHOUT PRIOR EPISODE: Status: ACTIVE | Noted: 2024-07-03

## 2024-07-03 PROBLEM — J45.20 ASTHMA, MILD INTERMITTENT: Status: ACTIVE | Noted: 2024-07-03

## 2024-07-03 PROBLEM — M10.9 GOUT: Status: ACTIVE | Noted: 2024-07-03

## 2024-07-03 PROBLEM — F32.1 CURRENT MODERATE EPISODE OF MAJOR DEPRESSIVE DISORDER WITHOUT PRIOR EPISODE: Status: RESOLVED | Noted: 2024-07-03 | Resolved: 2024-07-03

## 2024-07-03 PROBLEM — F41.9 ANXIETY: Status: ACTIVE | Noted: 2024-07-03

## 2024-07-03 PROBLEM — E66.09 CLASS 1 OBESITY DUE TO EXCESS CALORIES WITHOUT SERIOUS COMORBIDITY IN ADULT: Status: ACTIVE | Noted: 2024-07-03

## 2024-07-10 DIAGNOSIS — C64.9 RENAL CELL CARCINOMA, UNSPECIFIED LATERALITY: Primary | ICD-10-CM

## 2024-07-17 ENCOUNTER — TELEPHONE (OUTPATIENT)
Dept: OPTOMETRY | Facility: CLINIC | Age: 64
End: 2024-07-17
Payer: COMMERCIAL

## 2024-07-19 DIAGNOSIS — F41.9 ANXIETY: ICD-10-CM

## 2024-07-19 RX ORDER — FLUOXETINE HYDROCHLORIDE 40 MG/1
40 CAPSULE ORAL DAILY
Qty: 90 CAPSULE | Refills: 3 | Status: SHIPPED | OUTPATIENT
Start: 2024-07-19

## 2024-07-19 NOTE — TELEPHONE ENCOUNTER
No care due was identified.  [facility] Embedded Care Due Messages. Reference number: [id number].   7/19/2024 8:00:09 AM CDT

## 2024-07-31 ENCOUNTER — OFFICE VISIT (OUTPATIENT)
Dept: UROLOGY | Facility: CLINIC | Age: 64
End: 2024-07-31
Payer: COMMERCIAL

## 2024-07-31 VITALS
HEIGHT: 69 IN | BODY MASS INDEX: 32.03 KG/M2 | HEART RATE: 63 BPM | SYSTOLIC BLOOD PRESSURE: 120 MMHG | DIASTOLIC BLOOD PRESSURE: 82 MMHG | WEIGHT: 216.25 LBS

## 2024-07-31 DIAGNOSIS — C64.9 RENAL CELL CARCINOMA, UNSPECIFIED LATERALITY: ICD-10-CM

## 2024-07-31 DIAGNOSIS — N52.9 ERECTILE DYSFUNCTION, UNSPECIFIED ERECTILE DYSFUNCTION TYPE: Primary | ICD-10-CM

## 2024-07-31 PROCEDURE — 99999 PR PBB SHADOW E&M-EST. PATIENT-LVL III: CPT | Mod: PBBFAC,,, | Performed by: UROLOGY

## 2024-07-31 RX ORDER — OMEGA-3-ACID ETHYL ESTERS 1 G/1
1 CAPSULE, LIQUID FILLED ORAL
COMMUNITY
Start: 2024-06-19

## 2024-07-31 RX ORDER — TADALAFIL 10 MG/1
10 TABLET ORAL DAILY PRN
Qty: 10 TABLET | Refills: 3 | Status: SHIPPED | OUTPATIENT
Start: 2024-07-31 | End: 2025-07-31

## 2024-07-31 NOTE — PROGRESS NOTES
[facility]  Urologic Oncology      Date of Service: 07/31/2024    Chief Complaint/Reason for Consultation:  Renal cell carcinoma    Requesting Provider:   Padmini De Souza MD  [address]  [address]  [place] [address]    Urologic Oncology Problem List:  Renal cell carcinoma status post left partial nephrectomy at [place] in 2006  Peyronie's disease status post Xiaflex  Hypogonadism  BPH, not currently on medications  Erectile dysfunction    History of Present Illness:   History of Present Illness    Mr. Reina presents today for follow up.    He has a history of renal cell carcinoma treated at [facility] in 2006 by Dr. Filiberto Fine. He was followed up for 4 years post-treatment before transferring care to [facility]. His last follow-up imaging was an MRI instead of a CT urogram due to radiation exposure concerns. He reports the tumor was possibly stage 1, lowest grade. He discontinued regular follow-ups due to insurance coverage issues for out-of-state care. He denies any symptoms or concerns related to renal cell carcinoma recurrence but acknowledges not having thought about it until recently when he heard about a former colleague's cancer recurrence.    He has a history of Peyronie's disease, previously treated with Xiaflex injections. He reports possible curvature improvement but has difficulty assessing due to ongoing erectile issues. He was previously prescribed daily Cialis with an additional dose as needed during Xiaflex treatment, which he found helpful.     He has a erectile dysfunction, this is a new problem for him.  He has not been taking any erectile dysfunction medication for approximately a year. He and his wife have not been attempting sexual activity due to his erectile difficulties. He is unable to determine if the penile curvature has resolved completely due to insufficient erection quality. He denies current pain with nocturnal erections. On physical  "exam, he is unable to palpate any cord or plaque. He expresses interest in restarting treatment for erectile dysfunction.    He has a history of low testosterone. He briefly tried testosterone therapy via injection and later in pill form. However, he developed polycythemia as a side effect, with his hematocrit increasing significantly, necessitating blood donation. He discontinued testosterone therapy after approximately one month due to these side effects and nausea from the pills. He denies current use of testosterone therapy.    His father was diagnosed with pancreatic cancer. There is also a history of testicular, breast, and brain cancer in the family. His mother was tested for Multani syndrome, though the results are not specified.    He worked at [employer] for 12 years, followed by 12 years at [employer]. He moved to his current location 18 months ago, describing it as his "pre-assisted job."    His PSA was less than 1 in November. He is scheduled for his next PSA check in November with his PCP. He denies any family history of prostate cancer. Digital rectal exam performed today revealed no lumps or bumps.       No current imaging to review    His PSA trend is as follows:    Lab Results   Component Value Date    PSA 0.73 11/30/2023    PSA 0.65 12/08/2022       Current Problem List:  Patient Active Problem List    Diagnosis Date Noted    Anxiety 07/03/2024    Gout 07/03/2024    Asthma, mild intermittent 07/03/2024    Class 1 obesity due to excess calories without serious comorbidity in adult 07/03/2024    Renal cell carcinoma, unspecified laterality 11/30/2023        Allergies:  Review of patient's allergies indicates:  No Known Allergies     Medications per EMR:  (Not in a hospital admission)      Past Medical History:  Past Medical History:   Diagnosis Date    Allergic rhinitis     Anxiety     DDD (degenerative disc disease), cervical     GERD (gastroesophageal reflux disease)     Gout     " "History of shingles     Hypertriglyceridemia     Reactive airway disease     Renal cell carcinoma     Right, s/p partial nephrectomy        Past Surgical History:  Past Surgical History:   Procedure Laterality Date    ADENOIDECTOMY      COLONOSCOPY  2021    Normal    ESOPHAGOGASTRODUODENOSCOPY      PARTIAL NEPHRECTOMY Right     RCC    SMALL INTESTINE SURGERY      Volvulus, SBO    TONSILLECTOMY          Family History:  Family History   Problem Relation Name Age of Onset    Breast cancer Mother Dorinda Reina     Cancer Mother Dorinda Reina     Asthma Father Marcio Reina     Allergies Father Marcio Reina     Testicular cancer Father Marcio Reina     Pancreatic cancer Father Marcio Reina     Cancer Father Marcio Reina     Allergies Brother      Asthma Brother      Colon polyps Brother      Brain cancer Paternal Grandmother Johanna Chauhan     Cancer Paternal Grandmother Johanna Chauhan     Asthma Daughter      Allergies Daughter      Asthma Son      Allergies Son      Colon cancer Neg Hx      Melanoma Neg Hx          Social History:  Social History     Tobacco Use    Smoking status: Never     Passive exposure: Past    Smokeless tobacco: Never   Substance Use Topics    Alcohol use: Not Currently     Comment: Rarely          OBJECTIVE:     Vitals:    07/31/24 0834   BP: 120/82   Pulse: 63   Weight: 98.1 kg (216 lb 4.3 oz)   Height: 5' 9" (1.753 m)        Physical Exam    General: No acute distress. Nontoxic appearing.  HENT: Normocephalic. Atraumatic.  Respiratory: Normal respiratory effort. No conversational dyspnea. No audible wheezing.  Abdomen: No obvious distension.  Skin: No visible abnormalities.  Extremities: No edema upper extremities. No edema lower extremities.  Neurological: Alert and oriented x3. Normal speech.  Psychiatric: Normal mood. Normal affect. No evidence of SI.       JAJA: no palpable masses or tumors  Penile exam: circumcised phallus; bilaterally descended testes, palpable chord/plaque on dorsal " aspect of phallus    LAB:    CBC:  Lab Results   Component Value Date    WBC 5.71 11/30/2023    HGB 16.9 11/30/2023    HCT 49.8 11/30/2023    MCV 91 11/30/2023     11/30/2023         BMP:  Lab Results   Component Value Date     (H) 11/30/2023    K 4.5 11/30/2023     11/30/2023    CO2 28 11/30/2023    BUN 16 11/30/2023    CREATININE 1.4 11/30/2023    CALCIUM 9.9 11/30/2023    ANIONGAP 12 11/30/2023    EGFRNORACEVR 56.8 (A) 11/30/2023         ASSESSMENT/PLAN:   Assessment & Plan      64 year old male with a history of renal cell carcinoma status post partial nephrectomy, Peyronie's disease status post Xiaflex, erectile dysfunction, and prostate cancer screening    RENAL CELL CARCINOMA (RCC):  - Assessed patient's history of renal cell carcinoma (RCC) treated at [place] in 2006.  - Will order ultrasound for RCC surveillance  - Explained rationale for ultrasound as initial imaging modality for RCC surveillance.  - will send message through REach for results    ERECTILE DYSFUNCTION:  - Evaluated erectile dysfunction and history of Peyronie's disease.  - Informed about potential side effects of Cialis, including dizziness, congestion, blurred vision, and hypotension.  - Educated on priapism as a rare but serious side effect of erectile dysfunction medications.  - Started Cialis 5-10 mg daily with option to increase to 20 mg for nights of sexual intercourse    PEYRONIE'S DISEASE:  - Offered potential referral to Dr. Estrada for further evaluation of Peyronie's disease, pending patient's response to Cialis treatment, we will hold off on referral until we have a better idea of his curvature with an erection after taking phosphodiesterase inhibitors    PROSTATE HEALTH:  - Reviewed PSA results, noting value less than 1, indicating low risk for prostate cancer.  - Performed physical exam including genital exam and digital rectal exam (JAJA)- no evidence of cancer, continue screening PSA in November as ordered  by PCP  - Discussed  guidelines on PSA screening for prostate cancer.    FOLLOW UP:  - Follow up when November PSA results are available.  - Contact the office via email or [email or web address] message if needed.         I spent a total of 45 minutes on the day of the visit.This includes face to face time and non-face to face time preparing to see the patient (eg, review of tests), obtaining and/or reviewing separately obtained history, documenting clinical information in the electronic or other health record, independently interpreting results and communicating results to the patient/family/caregiver, or care coordinator  This encounter was dictated and transcribed using DeepScribe and FluencyDirect, please excuse any typographical or grammatical errors.

## 2024-08-02 ENCOUNTER — HOSPITAL ENCOUNTER (OUTPATIENT)
Dept: RADIOLOGY | Facility: HOSPITAL | Age: 64
Discharge: HOME | End: 2024-08-02
Attending: UROLOGY
Payer: COMMERCIAL

## 2024-08-02 DIAGNOSIS — C64.9 RENAL CELL CARCINOMA, UNSPECIFIED LATERALITY: ICD-10-CM

## 2024-08-02 PROCEDURE — 76775 US EXAM ABDO BACK WALL LIM: CPT | Mod: TC

## 2024-08-02 PROCEDURE — 76775 US EXAM ABDO BACK WALL LIM: CPT | Mod: 26,,, | Performed by: INTERNAL MEDICINE

## 2024-08-05 ENCOUNTER — PATIENT MESSAGE (OUTPATIENT)
Dept: UROLOGY | Facility: CLINIC | Age: 64
End: 2024-08-05
Payer: COMMERCIAL

## 2024-08-23 ENCOUNTER — OFFICE VISIT (OUTPATIENT)
Dept: DERMATOLOGY | Facility: CLINIC | Age: 64
End: 2024-08-23
Payer: COMMERCIAL

## 2024-08-23 VITALS — BODY MASS INDEX: 31.9 KG/M2 | WEIGHT: 216 LBS

## 2024-08-23 DIAGNOSIS — D18.01 CHERRY ANGIOMA: ICD-10-CM

## 2024-08-23 DIAGNOSIS — Z12.83 SKIN EXAM, SCREENING FOR CANCER: ICD-10-CM

## 2024-08-23 DIAGNOSIS — D22.9 NEVUS OF MULTIPLE SITES: ICD-10-CM

## 2024-08-23 DIAGNOSIS — L57.0 ACTINIC KERATOSIS: Primary | ICD-10-CM

## 2024-08-23 DIAGNOSIS — L81.4 LENTIGINES: ICD-10-CM

## 2024-08-23 DIAGNOSIS — L82.1 SEBORRHEIC KERATOSES: ICD-10-CM

## 2024-08-23 PROCEDURE — 99999 PR PBB SHADOW E&M-EST. PATIENT-LVL III: CPT | Mod: PBBFAC,,, | Performed by: DERMATOLOGY

## 2024-08-23 NOTE — PROGRESS NOTES
Subjective:      Patient ID:  Oneal Reina is a 64 y.o. male who presents for   Chief Complaint   Patient presents with    Follow-up     Would like skin check, new spot on left cheek.  Jogger, plays golf wears long athletic sleeves.     Follow-up - Follow-up  Affected locations: face, left arm, right arm, chest, torso, back, abdomen, left upper leg, right upper leg, left lower leg and right lower leg  Signs / symptoms: asymptomatic      Review of Systems   Constitutional:  Negative for fever, chills, weight loss, weight gain, fatigue, night sweats and malaise.   Skin:  Positive for daily sunscreen use and activity-related sunscreen use. Negative for wears hat.   Hematologic/Lymphatic: Does not bruise/bleed easily.       Objective:   Physical Exam   Constitutional: He appears well-developed and well-nourished. No distress.   Neurological: He is alert and oriented to person, place, and time. He is not disoriented.   Psychiatric: He has a normal mood and affect.   Skin:   Areas Examined (abnormalities noted in diagram):   Head / Face Inspection Performed  Neck Inspection Performed  Chest / Axilla Inspection Performed  Abdomen Inspection Performed  Back Inspection Performed  RUE Inspected  LUE Inspection Performed  RLE Inspected  LLE Inspection Performed  Nails and Digits Inspection Performed                 Diagram Legend     Erythematous scaling macule/papule c/w actinic keratosis       Vascular papule c/w angioma      Pigmented verrucoid papule/plaque c/w seborrheic keratosis      Yellow umbilicated papule c/w sebaceous hyperplasia      Irregularly shaped tan macule c/w lentigo     1-2 mm smooth white papules consistent with Milia      Movable subcutaneous cyst with punctum c/w epidermal inclusion cyst      Subcutaneous movable cyst c/w pilar cyst      Firm pink to brown papule c/w dermatofibroma      Pedunculated fleshy papule(s) c/w skin tag(s)      Evenly pigmented macule c/w junctional nevus     Mildly  "variegated pigmented, slightly irregular-bordered macule c/w mildly atypical nevus      Flesh colored to evenly pigmented papule c/w intradermal nevus       Pink pearly papule/plaque c/w basal cell carcinoma      Erythematous hyperkeratotic cursted plaque c/w SCC      Surgical scar with no sign of skin cancer recurrence      Open and closed comedones      Inflammatory papules and pustules      Verrucoid papule consistent consistent with wart     Erythematous eczematous patches and plaques     Dystrophic onycholytic nail with subungual debris c/w onychomycosis     Umbilicated papule    Erythematous-base heme-crusted tan verrucoid plaque consistent with inflamed seborrheic keratosis     Erythematous Silvery Scaling Plaque c/w Psoriasis     See annotation      Assessment / Plan:        Actinic keratosis   Cryosurgery Procedure Note    Verbal consent from the patient is obtained and the patient is aware of the precancerous quality and need for treatment of these lesions. Liquid nitrogen cryosurgery is applied to the 1 actinic keratosis, as detailed in the physical exam, to produce a freeze injury.  Brochure provided        Seborrheic keratoses  Seborrheic keratosis scattered, told benign no treatment needed..        Lentigines  The "ABCD" rules to observe pigmented lesions were reviewed.      Christensen angiomas  This is a benign vascular lesion. Reassurance given. No treatment required.       Skin exam, screening for cancer  . No lesions suspicious for malignancy noted.    Recommend daily sun protection/avoidance and use of at least SPF 30, broad spectrum sunscreen (OTC drug).       Nevus of multiple sites  The "ABCD" rules to observe pigmented lesions were reviewed.  Brochure provided               Follow up in about 2 years (around 8/23/2026).  "

## 2024-08-30 ENCOUNTER — PATIENT MESSAGE (OUTPATIENT)
Dept: PRIMARY CARE CLINIC | Facility: CLINIC | Age: 64
End: 2024-08-30
Payer: COMMERCIAL

## 2024-09-06 ENCOUNTER — PATIENT MESSAGE (OUTPATIENT)
Dept: PRIMARY CARE CLINIC | Facility: CLINIC | Age: 64
End: 2024-09-06
Payer: COMMERCIAL

## 2024-09-06 DIAGNOSIS — F41.9 ANXIETY: ICD-10-CM

## 2024-09-06 RX ORDER — FLUOXETINE HYDROCHLORIDE 20 MG/1
20 CAPSULE ORAL DAILY
Qty: 90 CAPSULE | Refills: 0 | Status: SHIPPED | OUTPATIENT
Start: 2024-09-06

## 2024-09-06 NOTE — TELEPHONE ENCOUNTER
Pt is requesting a refill of the 20mg fluoxetine, pended. Pt is out of the 20mg tablets.      LOV with Padmini De Souza MD , 7/2/2024

## 2024-09-06 NOTE — TELEPHONE ENCOUNTER
No care due was identified.  [facility] Embedded Care Due Messages. Reference number: [id number].   9/06/2024 10:15:45 AM CDT

## 2024-10-15 ENCOUNTER — PATIENT MESSAGE (OUTPATIENT)
Dept: PRIMARY CARE CLINIC | Facility: CLINIC | Age: 64
End: 2024-10-15
Payer: COMMERCIAL

## 2024-10-28 ENCOUNTER — TELEPHONE (OUTPATIENT)
Dept: HEMATOLOGY/ONCOLOGY | Facility: CLINIC | Age: 64
End: 2024-10-28
Payer: COMMERCIAL

## 2024-10-31 ENCOUNTER — OFFICE VISIT (OUTPATIENT)
Dept: OPTOMETRY | Facility: CLINIC | Age: 64
End: 2024-10-31
Payer: COMMERCIAL

## 2024-10-31 DIAGNOSIS — H04.123 DRY EYE SYNDROME OF BOTH EYES: ICD-10-CM

## 2024-10-31 DIAGNOSIS — H52.203 MYOPIA WITH ASTIGMATISM AND PRESBYOPIA, BILATERAL: ICD-10-CM

## 2024-10-31 DIAGNOSIS — H50.811 DUANE'S SYNDROME OF BOTH EYES: ICD-10-CM

## 2024-10-31 DIAGNOSIS — H52.4 MYOPIA WITH ASTIGMATISM AND PRESBYOPIA, BILATERAL: ICD-10-CM

## 2024-10-31 DIAGNOSIS — H25.13 SENILE NUCLEAR SCLEROSIS, BILATERAL: ICD-10-CM

## 2024-10-31 DIAGNOSIS — Z01.00 EYE EXAM, ROUTINE: Primary | ICD-10-CM

## 2024-10-31 DIAGNOSIS — H50.812 DUANE'S SYNDROME OF BOTH EYES: ICD-10-CM

## 2024-10-31 DIAGNOSIS — H52.13 MYOPIA WITH ASTIGMATISM AND PRESBYOPIA, BILATERAL: ICD-10-CM

## 2024-10-31 DIAGNOSIS — H43.813 VITREOUS DEGENERATION OF BOTH EYES: ICD-10-CM

## 2024-10-31 PROCEDURE — 99999 PR PBB SHADOW E&M-EST. PATIENT-LVL III: CPT | Mod: PBBFAC,,, | Performed by: OPTOMETRIST

## 2024-10-31 PROCEDURE — 1160F RVW MEDS BY RX/DR IN RCRD: CPT | Mod: CPTII,S$GLB,, | Performed by: OPTOMETRIST

## 2024-10-31 PROCEDURE — 1159F MED LIST DOCD IN RCRD: CPT | Mod: CPTII,S$GLB,, | Performed by: OPTOMETRIST

## 2024-10-31 PROCEDURE — 92015 DETERMINE REFRACTIVE STATE: CPT | Mod: S$GLB,,, | Performed by: OPTOMETRIST

## 2024-10-31 PROCEDURE — 92014 COMPRE OPH EXAM EST PT 1/>: CPT | Mod: S$GLB,,, | Performed by: OPTOMETRIST

## 2024-11-04 ENCOUNTER — OFFICE VISIT (OUTPATIENT)
Dept: HEMATOLOGY/ONCOLOGY | Facility: CLINIC | Age: 64
End: 2024-11-04
Payer: COMMERCIAL

## 2024-11-04 DIAGNOSIS — Z80.9 FAMILY HISTORY OF CANCER: ICD-10-CM

## 2024-11-04 PROCEDURE — 99999 PR PBB SHADOW E&M-EST. PATIENT-LVL III: CPT | Mod: PBBFAC,,,

## 2024-11-04 NOTE — PROGRESS NOTES
Cancer Genetics  Hereditary and High-Risk Clinic  Department of Hematology and Oncology  [facility]    Date of Service:  24  Visit Provider:  Terry Drake, [facility], [facility]  Collaborating Physician:  Chance Franklin MD    Patient ID  Name: Oneal Reina    : 1960    MRN: [id number]      Referring Provider:   Padmini De Souza MD  [address]  [address]  [place] [address]    Visit Information  The patient location is:  [facility].    The chief complaint leading to consultation is:  As below.    Visit type: in-person  Face-to-face time with patient:  Approximately 40 minutes.    Approximately 70 minutes in total were spent on this encounter, which includes face-to-face time and non-face-to-face time preparing to see the patient (e.g., review of tests), obtaining and/or reviewing separately obtained history, documenting clinical information in the electronic or other health record, independently interpreting results (not separately reported) and communicating results to the patient/family/caregiver, or coordinating care (not separately reported).      STELLA Reina is a 64 y.o. male who was referred to genetic counseling due to his family history of breast cancer (in mother) and pancreatic cancer (in father), and personal history of renal cell carcinoma, for which he meets NCCN guidelines for testing. Oneal previously had genetic testing ordered in  through [facility] ([facility] common cancer panel + renal cancer panel) which was negative. We discussed how the panel ordered was comprehensive for genes related to hereditary breast and ovarian cancer, pancreatic cancer, and syndromic causes of testicular and renal cell carcinoma. No additional testing is indicated at this time, however, Oneal was offered expanded screening if desired, as [facility]'s Hereditary Renal/Urinary Tract Cancers Panel has expanded since  and  "includes new genes + preliminary evidence genes. Additional testing was declined at this time. We also discussed the option of pancreatic cancer screening (EUS) due to the family history of a 1st degree relative with pancreatic cancer, however, NCCN recommends screening when there is a family history of both a 1st degree and 2nd degree relative with pancreatic cancer. Screening was deferred at this time. Oneal was recommended to follow-up with genetics every 1-2 years or if he decides he would like expanded testing. Oneal's brothers could consider genetic testing given they too meet NCCN guidelines for testing.    SUBJECTIVE      Chief Complaint: Genetic evaluation (family history of cancer)    History of Present Illness (HPI):  Oneal Reina ("Oneal"), 64 y.o., assigned male sex at birth, is new to the [facility] of [facility]ology and On[facility] and to me.  He was referred by Dr. Padmini De Souza (Internal Medicine) for hereditary cancer risk assessment given his family history of cancer.    Age:  64 y.o.   Previous germline cancer genetic testing:  Yes - [facility] common cancer panel + renal cancer panel  (negative)     Cancer History  Personal history of cancer   Renal cell cancer at 47yo - clear cell, unilateral cancer; s/p resection of the mass, partial nephrectomy  No personal history masses/tumors/lesions    Hormone Use  Testosterone: briefly tried testosterone therapy via injection and later in pill form, discontinued after one month due to side effects    GI History  Upper GI screenin - negative  Most recent colonoscopy:   Colon polyp:  No  Pancreatitis:  No    Prostate History  No issues reported  PSA: 0.73 2023  JAJA: no palpable masses or tumors    Dermatologic History  No issues reported  Abnormal moles/lesions: 1 actinic keratosis 24  Skin cancer screening: N/A     Focused Social History  Alcohol use: does not use alcohol   Tobacco use: non-smoker    Review of Systems   Patient's " Distress Score today was 1/10 (with 10 being the worst). Patient attributes this to low general life stress.  Patient denies experiencing suicidal or homicidal ideations (SI/HI).  Patient declined referral to mental health services.    ONCOLOGY PEDIGREE  Ancestry: paternal - Daniel/Slovakian, maternal - [place]    Ashkenazi Faith:  No  Consanguinity:  No  Hereditary cancer genetic testing in blood relatives:  No    Family Cancer Pedigree:  Pedigree Image    Oneal reported his  family history of cancer as follows:   Mother ( at 54yo) diagnosed with breast cancer at 46yo  Maternal aunt ( at 54yo) diagnosed with lung cancer (smoke exposure)  Maternal cousin ( at 54yo) diagnosed with cancer, unknown type (smoke exposure)   Father ( at 78yo) diagnosed with pancreatic cancer at 78yo and testicular cancer at 37yo  Paternal uncle ( at 54yo) diagnosed with lung cancer at 49yo (smoke exposure)  Two paternal cousins () diagnosed with cancer, unknown type  Paternal grandmother ( at 74yo) diagnosed with a brain tumor at 74yo     COUNSELING      Pre-test cancer genetic counseling was conducted.        Causes of Cancer:  Cancer occurs when cells grow out of control. Some genes help protect against cancer by controlling the growth of cells. However, mutations (problems) in these genes can prevent them from working properly, increasing the risk of developing cancer.  Sporadic Cancer: Most people who get cancer have sporadic cancer. Sporadic cancer is caused by mutations that occur during the lifetime. These mutations may be caused by aging, environmental exposures (ex. UV radiation, carcinogens), lifestyle (ex. smoking, drinking alcohol, sunbathing, poor diet), other medical conditions (ex. hepatitis, HPV, ulcerative colitis), or other factors.   Hereditary Cancer: A small percentage (5-10%) of people who develop cancer were born with a mutation already in one of  the cancer protection genes.  Familial Cancer: Cancer can also cluster in families that do not have an identifiable mutation. This may be due to shared environmental or lifestyle factors or genetic risk factors that have not been identified or cannot be detected using current technology or panels.     The likelihood of having a hereditary cancer risk depends on many factors including who in the family had cancer, what type of cancer they had, their age at diagnosis, cancer specifics (such as MMR status of an endometrial or colon cancer or type of breast cancer), and previous genetic testing in the family. Typically, the chance is higher for families that have multiple people with the same or related cancers, an individual with multiple cancers, younger ages of cancer, and certain types of cancer (such as pancreatic or triple negative breast cancer).     Possible Results:    Positive (pathogenic or likely pathogenic variant): A genetic variant was found that is suspected or known to impact the function of the gene. The impact of a positive result on an individual's risk of cancer varies based on the gene, specific variant, individual's sex assigned at birth, personal cancer history, other health history (such as surgical history), and family history. A positive result can impact screening and risk management recommendations. However, there are not always available guidelines for management based on a specific gene variant. Family history and personal risk factors should always be considered. Sometimes, a positive result can also have treatment or reproductive implications.   Negative: No clinically significant variants were reported in the tested genes. A negative result does not indicate that an individual cannot develop cancer or even that the individual is at average risk. An individual may still be at an increased risk for cancer based on personal risk factors or family history. Additionally, there could be a  hereditary cancer predisposition that was not included in a chosen panel or is not detected with current technology.   Variant of Uncertain Significance (VUS): A variant was found. However, the lab does not have enough information to determine if the variant is benign (harmless) or pathogenic (impacts the function of the gene). The laboratory may update (reclassify) the variant over time as more information becomes available. When reclassified, most variants of uncertain significance are reclassified to benign/likely benign. Typically, it is not recommended to  based on the presence of a VUS. The chance of finding a VUS varies based on the test performed. Generally, the chance of finding a VUS increases with the number of genes tested and decreases with the amount of testing of that gene (genes that are tested more frequently or for a longer period of time have a lower VUS rate).     Genetic Mutation Inheritance:  When an individual has a gene mutation, their first-degree relatives (parents, children, and siblings) each have a 50% chance of carrying the same mutation. Other, more distant blood relatives can also be at risk of carrying the same mutation. At-risk relatives of an individual with a mutation should consider genetic testing to help determine their risk for cancer.     Genetic Discrimination: The Genetic Information Nondiscrimination Act of 2008 (DEVON) is a [place] federal law that provides some protections against the use of an individual's genetic information by their health insurer and by their employer. Title I of DEVON prohibits most health insurers (except for insurance obtained through a job with the  or the Federal Employees Health Benefits Plan) from utilizing an individual's genetic information to make decisions regarding insurance eligibility or premium charges. Title II of DEVON prohibits covered entities from requesting or requiring the genetic information of employees and  applicants and from using said information to make employment decisions. This does not apply to employers with fewer than 15 employees or to the .  DEVON also does not protect individuals from genetic discrimination by any other type of policy or entity, including but not limited to life insurance, disability insurance, long-term care insurance,  benefits, and [place] [facility] benefits.    Genetic Testing Options:   Various genetic testing panel options were discussed along with associated benefits, limitations, and risks.   There are several issues to consider regarding multi-gene testing:  Insurance coverage can vary depending on the genetic test panel(s) ordered.  There are limited data and a lack of clear guidelines regarding degree of cancer risk associated with some of the genes assessed and how to communicate and manage risk for carriers of these genes; this issue is compounded by the low incidence rates of hereditary disease; multi-gene tests include moderate-penetrance genes, and they often also include low-penetrance genes for which there are little available data regarding degree of cancer risk and guidelines for risk management.  Increased likelihood of detecting a genetic variant of unknown significance (VUS).  Increased chance of finding genotypically distinct cell lines (i.e., genetic mosaicism) with next-generation sequencing; clones of non-cancerous cell containing certain genetic mutations have been found in healthy adults undergoing multi-gene testing; this phenomenon can often be attributed to clonal hematopoiesis, a condition in which a hematopoietic stem cell begins making blood cells with the same acquired mutation.    The option for DNA only vs DNA+RNA was discussed. Adding RNA has a small chance of helping to clarify a VUS or detecting genetic variants that DNA only cannot (deep intronic variants). However, it must be conducted on a blood sample (not saliva).     Genetic  Testing Guidelines: Oneal's reported personal/family history meets the genetic testing criteria established by the National Comprehensive Cancer Network Genetic/Familial High-Risk Assessment: Breast, Ovarian, and Pancreatic version 1.2025.  However, Oneal has previously had genetic testing that was comprehensive for indicated genes. Therefore, no additional testing was ordered at this time.     ASSESSMENT / PLAN     Follow-up: No additional genetic testing was ordered at this time. Oneal was recommended to follow-up with genetics every 1-2 years or if he decides he would like expanded testing.    Oneal appeared to have a good understanding of the information. Questions were encouraged and answered to the patient's satisfaction, and he verbalized understanding of the information and agreement with the plan.   Oneal is welcome to contact me if he has any questions, concerns, or updates to his family history.     This assessment is based on the history and reports provided, as well as the current scientific knowledge regarding cancer genetics.     Terry Drake MGC, [facility]  Certified Genetic Counselor  Department of Hematology and Oncology  [facility]    CC:  Dr. Padmini De Souza

## 2024-11-11 ENCOUNTER — PATIENT MESSAGE (OUTPATIENT)
Dept: HEMATOLOGY/ONCOLOGY | Facility: CLINIC | Age: 64
End: 2024-11-11
Payer: COMMERCIAL

## 2024-11-27 DIAGNOSIS — F41.9 ANXIETY: ICD-10-CM

## 2024-12-02 RX ORDER — FLUOXETINE HYDROCHLORIDE 20 MG/1
20 CAPSULE ORAL DAILY
Qty: 90 CAPSULE | Refills: 0 | Status: SHIPPED | OUTPATIENT
Start: 2024-12-02 | End: 2024-12-03 | Stop reason: SDUPTHER

## 2024-12-03 ENCOUNTER — OFFICE VISIT (OUTPATIENT)
Dept: INTERNAL MEDICINE | Facility: CLINIC | Age: 64
End: 2024-12-03
Payer: COMMERCIAL

## 2024-12-03 ENCOUNTER — CLINICAL SUPPORT (OUTPATIENT)
Dept: INTERNAL MEDICINE | Facility: CLINIC | Age: 64
End: 2024-12-03
Payer: COMMERCIAL

## 2024-12-03 VITALS
DIASTOLIC BLOOD PRESSURE: 76 MMHG | HEART RATE: 75 BPM | SYSTOLIC BLOOD PRESSURE: 115 MMHG | BODY MASS INDEX: 33.13 KG/M2 | HEIGHT: 69 IN | WEIGHT: 223.69 LBS

## 2024-12-03 DIAGNOSIS — Z00.00 ROUTINE GENERAL MEDICAL EXAMINATION AT A HEALTH CARE FACILITY: Primary | ICD-10-CM

## 2024-12-03 DIAGNOSIS — Z00.00 ENCOUNTER FOR ANNUAL HEALTH EXAMINATION: Primary | ICD-10-CM

## 2024-12-03 DIAGNOSIS — F41.9 ANXIETY: ICD-10-CM

## 2024-12-03 LAB
ALBUMIN SERPL BCP-MCNC: 3.8 G/DL (ref 3.5–5.2)
ALP SERPL-CCNC: 68 U/L (ref 40–150)
ALT SERPL W/O P-5'-P-CCNC: 18 U/L (ref 10–44)
ANION GAP SERPL CALC-SCNC: 11 MMOL/L (ref 8–16)
AST SERPL-CCNC: 18 U/L (ref 10–40)
BILIRUB SERPL-MCNC: 0.3 MG/DL (ref 0.1–1)
BUN SERPL-MCNC: 15 MG/DL (ref 8–23)
CALCIUM SERPL-MCNC: 8.6 MG/DL (ref 8.7–10.5)
CHLORIDE SERPL-SCNC: 111 MMOL/L (ref 95–110)
CHOLEST SERPL-MCNC: 222 MG/DL (ref 120–199)
CHOLEST/HDLC SERPL: 4.4 {RATIO} (ref 2–5)
CO2 SERPL-SCNC: 23 MMOL/L (ref 23–29)
COMPLEXED PSA SERPL-MCNC: 0.48 NG/ML (ref 0–4)
CREAT SERPL-MCNC: 1.2 MG/DL (ref 0.5–1.4)
ERYTHROCYTE [DISTWIDTH] IN BLOOD BY AUTOMATED COUNT: 12 % (ref 11.5–14.5)
EST. GFR  (NO RACE VARIABLE): >60 ML/MIN/1.73 M^2
ESTIMATED AVG GLUCOSE: 103 MG/DL (ref 68–131)
GLUCOSE SERPL-MCNC: 105 MG/DL (ref 70–110)
HBA1C MFR BLD: 5.2 % (ref 4–5.6)
HCT VFR BLD AUTO: 49 % (ref 40–54)
HDLC SERPL-MCNC: 50 MG/DL (ref 40–75)
HDLC SERPL: 22.5 % (ref 20–50)
HGB BLD-MCNC: 16.3 G/DL (ref 14–18)
LDLC SERPL CALC-MCNC: 153.2 MG/DL (ref 63–159)
MCH RBC QN AUTO: 31.2 PG (ref 27–31)
MCHC RBC AUTO-ENTMCNC: 33.3 G/DL (ref 32–36)
MCV RBC AUTO: 94 FL (ref 82–98)
NONHDLC SERPL-MCNC: 172 MG/DL
PLATELET # BLD AUTO: 154 K/UL (ref 150–450)
PMV BLD AUTO: 10.3 FL (ref 9.2–12.9)
POTASSIUM SERPL-SCNC: 4.3 MMOL/L (ref 3.5–5.1)
PROT SERPL-MCNC: 6.5 G/DL (ref 6–8.4)
RBC # BLD AUTO: 5.23 M/UL (ref 4.6–6.2)
SODIUM SERPL-SCNC: 145 MMOL/L (ref 136–145)
TRIGL SERPL-MCNC: 94 MG/DL (ref 30–150)
TSH SERPL DL<=0.005 MIU/L-ACNC: 1.65 UIU/ML (ref 0.4–4)
WBC # BLD AUTO: 5.18 K/UL (ref 3.9–12.7)

## 2024-12-03 PROCEDURE — 84153 ASSAY OF PSA TOTAL: CPT | Performed by: INTERNAL MEDICINE

## 2024-12-03 PROCEDURE — 85027 COMPLETE CBC AUTOMATED: CPT | Performed by: INTERNAL MEDICINE

## 2024-12-03 PROCEDURE — 3078F DIAST BP <80 MM HG: CPT | Mod: CPTII,S$GLB,, | Performed by: INTERNAL MEDICINE

## 2024-12-03 PROCEDURE — 84443 ASSAY THYROID STIM HORMONE: CPT | Performed by: INTERNAL MEDICINE

## 2024-12-03 PROCEDURE — 99396 PREV VISIT EST AGE 40-64: CPT | Mod: S$GLB,,, | Performed by: INTERNAL MEDICINE

## 2024-12-03 PROCEDURE — 80053 COMPREHEN METABOLIC PANEL: CPT | Performed by: INTERNAL MEDICINE

## 2024-12-03 PROCEDURE — 3044F HG A1C LEVEL LT 7.0%: CPT | Mod: CPTII,S$GLB,, | Performed by: INTERNAL MEDICINE

## 2024-12-03 PROCEDURE — 3008F BODY MASS INDEX DOCD: CPT | Mod: CPTII,S$GLB,, | Performed by: INTERNAL MEDICINE

## 2024-12-03 PROCEDURE — 99999 PR PBB SHADOW E&M-EST. PATIENT-LVL I: CPT | Mod: PBBFAC,,,

## 2024-12-03 PROCEDURE — 80061 LIPID PANEL: CPT | Performed by: INTERNAL MEDICINE

## 2024-12-03 PROCEDURE — 3074F SYST BP LT 130 MM HG: CPT | Mod: CPTII,S$GLB,, | Performed by: INTERNAL MEDICINE

## 2024-12-03 PROCEDURE — 83036 HEMOGLOBIN GLYCOSYLATED A1C: CPT | Performed by: INTERNAL MEDICINE

## 2024-12-03 PROCEDURE — 1159F MED LIST DOCD IN RCRD: CPT | Mod: CPTII,S$GLB,, | Performed by: INTERNAL MEDICINE

## 2024-12-03 PROCEDURE — 99999 PR PBB SHADOW E&M-EST. PATIENT-LVL III: CPT | Mod: PBBFAC,,, | Performed by: INTERNAL MEDICINE

## 2024-12-03 RX ORDER — FLUOXETINE HYDROCHLORIDE 20 MG/1
20 CAPSULE ORAL DAILY
Qty: 90 CAPSULE | Refills: 3 | Status: SHIPPED | OUTPATIENT
Start: 2024-12-03

## 2024-12-04 NOTE — PROGRESS NOTES
Subjective:       Patient ID: Oneal Reina is a 64 y.o. male.    Chief Complaint: Executive Health      HPI  Annual health exam. Reviewed medical, surgical, social and family history, medications, appropriate preventive health screenings, as well as vaccination history. Updates as noted below or in assessment and plan.    Generally well. Weight is up but intends on increasing exercises and cutting calorie intake. Remains active with work. Anxiety controlled on Fluoxetine. Taking 20 mg daily.  Gout doing well with no recent flairs. Has come off Allopurinol. Some right lateral ankle pains at times. Does run for exercise.    Review of Systems   All other systems reviewed and are negative.      Past Medical History:   Diagnosis Date    Actinic keratosis     Allergic rhinitis     Anxiety     DDD (degenerative disc disease), cervical     Duane's syndrome of left eye     GERD (gastroesophageal reflux disease)     Gout     History of shingles     Hypertriglyceridemia     Reactive airway disease     Renal cell carcinoma     Right, s/p partial nephrectomy         Current Outpatient Medications:     albuterol (PROVENTIL/VENTOLIN HFA) 90 mcg/actuation inhaler, Inhale 1-2 puffs into the lungs every 6 (six) hours as needed., Disp: , Rfl:     CYANOCOBALAMIN, VITAMIN B-12, ORAL, Take 5,000 mcg by mouth., Disp: , Rfl:     FLUoxetine 20 MG capsule, Take 1 capsule (20 mg total) by mouth once daily., Disp: 90 capsule, Rfl: 3    fluticasone propionate (FLONASE) 50 mcg/actuation nasal spray, 1 spray by Nasal route., Disp: , Rfl:     omega-3 acid ethyl esters (LOVAZA) 1 gram capsule, Take 1 g by mouth., Disp: , Rfl:     omega-3 fatty acids 1,000 mg Cap, Take 1 capsule (1,000 mg total) by mouth Daily., Disp: 90 capsule, Rfl: 3    tadalafiL (CIALIS) 10 MG tablet, Take 1 tablet (10 mg total) by mouth daily as needed for Erectile Dysfunction. Can take up to 20mg daily for ED, Disp: 10 tablet, Rfl: 3    Past Surgical History:   Procedure  Laterality Date    ADENOIDECTOMY      COLONOSCOPY  2021    Normal    ESOPHAGOGASTRODUODENOSCOPY      PARTIAL NEPHRECTOMY Right     RCC    SMALL INTESTINE SURGERY      Volvulus, SBO    TONSILLECTOMY         Family History   Problem Relation Name Age of Onset    Breast cancer Mother Dorinda Reina 45    Asthma Father Marcio Reina     Allergies Father Marcio Reina     Testicular cancer Father Marcio Reina     Pancreatic cancer Father Marcio Reina 77    Allergies Brother Marcio     Asthma Brother Marcio     Colon polyps Brother Marcio     Brain cancer Paternal Grandmother Johanna Chauhan 79        possible glioblastoma    Asthma Daughter Taniya     Allergies Daughter Taniya     Asthma Son Ashwin     Allergies Son Ashwin     Stroke Maternal Grandmother      Lung cancer Maternal Aunt Kathie     Lung cancer Paternal Uncle Edward 50        smoke exposure    Celiac disease Other      Cancer Maternal Cousin      Cancer Paternal Cousin      Cancer Paternal Cousin      Colon cancer Neg Hx      Melanoma Neg Hx         Social History     Tobacco Use    Smoking status: Never     Passive exposure: Past    Smokeless tobacco: Never   Substance Use Topics    Alcohol use: Not Currently     Comment: Rarely    Drug use: Never       Immunization History   Administered Date(s) Administered    COVID-19, MRNA, LN-S, PF (Pfizer) (Purple Cap) 12/15/2020, 01/05/2021, 09/18/2021    COVID-19, mRNA, LNP-S, PF (Moderna) Ages 12+ 10/24/2023    Hepatitis B (recombinant) Adjuvanted, 2 dose 12/15/2022    Influenza - Quadrivalent 09/28/2018, 11/07/2019, 10/22/2020, 10/04/2021    Influenza - Quadrivalent - PF *Preferred* (6 months and older) 09/15/2023    Influenza - Trivalent - Afluria, Fluzone MDV 12/31/2013, 10/15/2014, 11/01/2015, 10/21/2016    Influenza - Trivalent - Flucelvax - PF 09/06/2024    Pneumococcal Polysaccharide - 23 Valent 09/14/2023    RSVpreF (Arexvy) 10/24/2023    Tdap 11/19/2018, 12/08/2022    Zoster Recombinant 12/08/2022,  11/30/2023         Objective:      Vitals:    12/03/24 1349   BP: 115/76   Pulse: 75       Physical Exam  Constitutional:       General: He is not in acute distress.     Appearance: Normal appearance. He is well-developed. He is not ill-appearing.   HENT:      Head: Normocephalic and atraumatic.      Right Ear: Hearing and tympanic membrane normal. There is no impacted cerumen.      Left Ear: Hearing and tympanic membrane normal. There is no impacted cerumen.      Nose: Nose normal.      Mouth/Throat:      Mouth: Mucous membranes are moist.      Pharynx: Oropharynx is clear.   Eyes:      Extraocular Movements: Extraocular movements intact.      Conjunctiva/sclera: Conjunctivae normal.      Pupils: Pupils are equal, round, and reactive to light.   Neck:      Vascular: No carotid bruit.   Cardiovascular:      Rate and Rhythm: Normal rate and regular rhythm.      Heart sounds: Normal heart sounds. No murmur heard.  Pulmonary:      Effort: Pulmonary effort is normal. No respiratory distress.      Breath sounds: Normal breath sounds. No wheezing, rhonchi or rales.   Abdominal:      General: Abdomen is flat. There is no distension.      Palpations: Abdomen is soft. There is no mass.      Tenderness: There is no abdominal tenderness.      Hernia: No hernia is present.   Musculoskeletal:         General: No swelling or deformity. Normal range of motion.      Cervical back: No tenderness.      Right lower leg: No edema.      Left lower leg: No edema.   Lymphadenopathy:      Cervical: No cervical adenopathy.   Skin:     General: Skin is warm and dry.      Findings: No lesion or rash.   Neurological:      General: No focal deficit present.      Mental Status: He is alert and oriented to person, place, and time.      Cranial Nerves: No cranial nerve deficit.      Coordination: Coordination normal.      Gait: Gait normal.      Deep Tendon Reflexes: Reflexes normal.   Psychiatric:         Mood and Affect: Mood normal.          Behavior: Behavior normal.         Thought Content: Thought content normal.         Judgment: Judgment normal.         Recent Results (from the past 12 weeks)   Comprehensive metabolic panel    Collection Time: 12/03/24  7:17 AM   Result Value Ref Range    Sodium 145 136 - 145 mmol/L    Potassium 4.3 3.5 - 5.1 mmol/L    Chloride 111 (H) 95 - 110 mmol/L    CO2 23 23 - 29 mmol/L    Glucose 105 70 - 110 mg/dL    BUN 15 8 - 23 mg/dL    Creatinine 1.2 0.5 - 1.4 mg/dL    Calcium 8.6 (L) 8.7 - 10.5 mg/dL    Total Protein 6.5 6.0 - 8.4 g/dL    Albumin 3.8 3.5 - 5.2 g/dL    Total Bilirubin 0.3 0.1 - 1.0 mg/dL    Alkaline Phosphatase 68 40 - 150 U/L    AST 18 10 - 40 U/L    ALT 18 10 - 44 U/L    eGFR >60.0 >60 mL/min/1.73 m^2    Anion Gap 11 8 - 16 mmol/L   CBC Without Differential    Collection Time: 12/03/24  7:17 AM   Result Value Ref Range    WBC 5.18 3.90 - 12.70 K/uL    RBC 5.23 4.60 - 6.20 M/uL    Hemoglobin 16.3 14.0 - 18.0 g/dL    Hematocrit 49.0 40.0 - 54.0 %    MCV 94 82 - 98 fL    MCH 31.2 (H) 27.0 - 31.0 pg    MCHC 33.3 32.0 - 36.0 g/dL    RDW 12.0 11.5 - 14.5 %    Platelets 154 150 - 450 K/uL    MPV 10.3 9.2 - 12.9 fL   Lipid panel    Collection Time: 12/03/24  7:17 AM   Result Value Ref Range    Cholesterol 222 (H) 120 - 199 mg/dL    Triglycerides 94 30 - 150 mg/dL    HDL 50 40 - 75 mg/dL    LDL Cholesterol 153.2 63.0 - 159.0 mg/dL    HDL/Cholesterol Ratio 22.5 20.0 - 50.0 %    Total Cholesterol/HDL Ratio 4.4 2.0 - 5.0    Non-HDL Cholesterol 172 mg/dL   TSH    Collection Time: 12/03/24  7:17 AM   Result Value Ref Range    TSH 1.648 0.400 - 4.000 uIU/mL   PSA, Screening (every year)    Collection Time: 12/03/24  7:17 AM   Result Value Ref Range    PSA, Screen 0.48 0.00 - 4.00 ng/mL   Hemoglobin A1c    Collection Time: 12/03/24  7:17 AM   Result Value Ref Range    Hemoglobin A1C 5.2 4.0 - 5.6 %    Estimated Avg Glucose 103 68 - 131 mg/dL          Assessment/Plan:     1) Annual wellness exam  2) HLD - TG  controlled on daily fish oil but LDL mildly elevated. He will focus on wt loss and saturated fat limitation. Repeat lipids 6 to 12 mths.  3) RCC hx, s/p right partial nephrectomy - Saw Urology this recently with unremarkable US. May consider repeating in 1 yr, though is technically past need for continued surveillance.  4) Anxiety - Stable on Fluoxetine 20 daily.  5) Gout - Stable off allopurinol. Focus on wt loss and diet, hydration.    - Vaccines reviewed.  - Colonoscopy normal 2021 with plan for 10 yr f/u.  - PSA normal.  - Sees Derm for skin screening. AK hx. Continue yearly.  - BP and A1c screens normal. Continue wt loss, diet, exercise focus.

## 2025-04-30 ENCOUNTER — HOSPITAL ENCOUNTER (OUTPATIENT)
Dept: RADIOLOGY | Facility: HOSPITAL | Age: 65
Discharge: HOME | End: 2025-04-30
Attending: INTERNAL MEDICINE
Payer: COMMERCIAL

## 2025-04-30 ENCOUNTER — PATIENT MESSAGE (OUTPATIENT)
Dept: INTERNAL MEDICINE | Facility: CLINIC | Age: 65
End: 2025-04-30
Payer: COMMERCIAL
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
## 2025-04-30 DIAGNOSIS — M79.642 LEFT HAND PAIN: ICD-10-CM

## 2025-04-30 DIAGNOSIS — M79.642 LEFT HAND PAIN: Primary | ICD-10-CM

## 2025-04-30 PROCEDURE — 73130 X-RAY EXAM OF HAND: CPT | Mod: 26,LT,, | Performed by: RADIOLOGY

## 2025-04-30 PROCEDURE — 73130 X-RAY EXAM OF HAND: CPT | Mod: TC,LT

## 2025-05-20 ENCOUNTER — PATIENT MESSAGE (OUTPATIENT)
Dept: INTERNAL MEDICINE | Facility: CLINIC | Age: 65
End: 2025-05-20
Payer: COMMERCIAL

## 2025-09-04 RX ORDER — OMEGA-3 FATTY ACIDS 1000 MG
1 CAPSULE ORAL DAILY
Qty: 90 CAPSULE | Refills: 3 | Status: SHIPPED | OUTPATIENT
Start: 2025-09-04 | End: 2025-09-05 | Stop reason: SDUPTHER

## 2025-09-05 DIAGNOSIS — E78.5 HYPERLIPIDEMIA, UNSPECIFIED HYPERLIPIDEMIA TYPE: Primary | ICD-10-CM

## 2025-09-05 RX ORDER — OMEGA-3 FATTY ACIDS 1000 MG
1 CAPSULE ORAL DAILY
Qty: 90 CAPSULE | Refills: 3 | Status: SHIPPED | OUTPATIENT
Start: 2025-09-05 | End: 2026-09-05